# Patient Record
Sex: FEMALE | Race: WHITE | Employment: UNEMPLOYED | URBAN - METROPOLITAN AREA
[De-identification: names, ages, dates, MRNs, and addresses within clinical notes are randomized per-mention and may not be internally consistent; named-entity substitution may affect disease eponyms.]

---

## 2017-01-25 ENCOUNTER — OFFICE VISIT (OUTPATIENT)
Dept: ORTHOPEDIC SURGERY | Age: 67
End: 2017-01-25

## 2017-01-25 VITALS
HEART RATE: 78 BPM | DIASTOLIC BLOOD PRESSURE: 75 MMHG | HEIGHT: 60 IN | BODY MASS INDEX: 26.1 KG/M2 | WEIGHT: 132.94 LBS | SYSTOLIC BLOOD PRESSURE: 125 MMHG

## 2017-01-25 DIAGNOSIS — Z09 POSTOP CHECK: Primary | ICD-10-CM

## 2017-01-25 PROCEDURE — 99024 POSTOP FOLLOW-UP VISIT: CPT | Performed by: PODIATRIST

## 2017-01-25 RX ORDER — LOSARTAN POTASSIUM 25 MG/1
TABLET ORAL
Refills: 1 | COMMUNITY
Start: 2017-01-03 | End: 2022-03-28

## 2017-02-22 ENCOUNTER — OFFICE VISIT (OUTPATIENT)
Dept: ORTHOPEDIC SURGERY | Age: 67
End: 2017-02-22

## 2017-02-22 VITALS
HEART RATE: 70 BPM | DIASTOLIC BLOOD PRESSURE: 71 MMHG | HEIGHT: 60 IN | BODY MASS INDEX: 26.1 KG/M2 | SYSTOLIC BLOOD PRESSURE: 116 MMHG | WEIGHT: 132.94 LBS

## 2017-02-22 DIAGNOSIS — Z09 POSTOP CHECK: Primary | ICD-10-CM

## 2017-02-22 PROCEDURE — 4004F PT TOBACCO SCREEN RCVD TLK: CPT | Performed by: PODIATRIST

## 2017-02-22 PROCEDURE — 99999 PR OFFICE/OUTPT VISIT,PROCEDURE ONLY: CPT | Performed by: PODIATRIST

## 2018-01-16 ENCOUNTER — HOSPITAL ENCOUNTER (OUTPATIENT)
Dept: MRI IMAGING | Age: 68
Discharge: HOME | End: 2018-01-16

## 2018-01-16 ENCOUNTER — OFFICE VISIT (OUTPATIENT)
Dept: ORTHOPEDIC SURGERY | Age: 68
End: 2018-01-16

## 2018-01-16 VITALS
WEIGHT: 132.94 LBS | HEART RATE: 67 BPM | HEIGHT: 60 IN | DIASTOLIC BLOOD PRESSURE: 61 MMHG | SYSTOLIC BLOOD PRESSURE: 127 MMHG | BODY MASS INDEX: 26.1 KG/M2

## 2018-01-16 DIAGNOSIS — M75.42 IMPINGEMENT SYNDROME OF LEFT SHOULDER: ICD-10-CM

## 2018-01-16 DIAGNOSIS — M19.012 PRIMARY OSTEOARTHRITIS OF LEFT SHOULDER: ICD-10-CM

## 2018-01-16 DIAGNOSIS — M25.512 LEFT SHOULDER PAIN, UNSPECIFIED CHRONICITY: ICD-10-CM

## 2018-01-16 DIAGNOSIS — M25.512 LEFT SHOULDER PAIN, UNSPECIFIED CHRONICITY: Primary | ICD-10-CM

## 2018-01-16 PROCEDURE — G8484 FLU IMMUNIZE NO ADMIN: HCPCS | Performed by: PHYSICIAN ASSISTANT

## 2018-01-16 PROCEDURE — G8419 CALC BMI OUT NRM PARAM NOF/U: HCPCS | Performed by: PHYSICIAN ASSISTANT

## 2018-01-16 PROCEDURE — 3017F COLORECTAL CA SCREEN DOC REV: CPT | Performed by: PHYSICIAN ASSISTANT

## 2018-01-16 PROCEDURE — 1090F PRES/ABSN URINE INCON ASSESS: CPT | Performed by: PHYSICIAN ASSISTANT

## 2018-01-16 PROCEDURE — 3014F SCREEN MAMMO DOC REV: CPT | Performed by: PHYSICIAN ASSISTANT

## 2018-01-16 PROCEDURE — 4004F PT TOBACCO SCREEN RCVD TLK: CPT | Performed by: PHYSICIAN ASSISTANT

## 2018-01-16 PROCEDURE — 1123F ACP DISCUSS/DSCN MKR DOCD: CPT | Performed by: PHYSICIAN ASSISTANT

## 2018-01-16 PROCEDURE — G8427 DOCREV CUR MEDS BY ELIG CLIN: HCPCS | Performed by: PHYSICIAN ASSISTANT

## 2018-01-16 PROCEDURE — 99213 OFFICE O/P EST LOW 20 MIN: CPT | Performed by: PHYSICIAN ASSISTANT

## 2018-01-16 PROCEDURE — 4040F PNEUMOC VAC/ADMIN/RCVD: CPT | Performed by: PHYSICIAN ASSISTANT

## 2018-01-16 PROCEDURE — G8400 PT W/DXA NO RESULTS DOC: HCPCS | Performed by: PHYSICIAN ASSISTANT

## 2018-01-16 NOTE — PROGRESS NOTES
VIEWS) [id number]     Order Specific Question:   Reason for exam:     Answer:   Pain       Treatment Plan:  I have ordered the patient an MRI of her left shoulder. She does have some arthritis and signs of impingement syndrome not only on x-rays but clinical exam.  She does have some inferior subluxation of her humeral head compared to the glenohumeral joint. I would like her to see Dr. Lissette Cespedes back in office after her MRI. We have briefly discussed a cortisone injection based on MRI results and some physical therapy if warranted.

## 2018-01-22 ENCOUNTER — OFFICE VISIT (OUTPATIENT)
Dept: ORTHOPEDIC SURGERY | Age: 68
End: 2018-01-22

## 2018-01-22 DIAGNOSIS — M19.012 PRIMARY OSTEOARTHRITIS OF LEFT SHOULDER: ICD-10-CM

## 2018-01-22 DIAGNOSIS — M75.42 IMPINGEMENT SYNDROME OF LEFT SHOULDER: Primary | ICD-10-CM

## 2018-01-22 PROCEDURE — 4040F PNEUMOC VAC/ADMIN/RCVD: CPT | Performed by: ORTHOPAEDIC SURGERY

## 2018-01-22 PROCEDURE — 3014F SCREEN MAMMO DOC REV: CPT | Performed by: ORTHOPAEDIC SURGERY

## 2018-01-22 PROCEDURE — 1123F ACP DISCUSS/DSCN MKR DOCD: CPT | Performed by: ORTHOPAEDIC SURGERY

## 2018-01-22 PROCEDURE — G8400 PT W/DXA NO RESULTS DOC: HCPCS | Performed by: ORTHOPAEDIC SURGERY

## 2018-01-22 PROCEDURE — 99213 OFFICE O/P EST LOW 20 MIN: CPT | Performed by: ORTHOPAEDIC SURGERY

## 2018-01-22 PROCEDURE — 4004F PT TOBACCO SCREEN RCVD TLK: CPT | Performed by: ORTHOPAEDIC SURGERY

## 2018-01-22 PROCEDURE — 3017F COLORECTAL CA SCREEN DOC REV: CPT | Performed by: ORTHOPAEDIC SURGERY

## 2018-01-22 PROCEDURE — 20611 DRAIN/INJ JOINT/BURSA W/US: CPT | Performed by: ORTHOPAEDIC SURGERY

## 2018-01-22 PROCEDURE — 1090F PRES/ABSN URINE INCON ASSESS: CPT | Performed by: ORTHOPAEDIC SURGERY

## 2018-01-22 PROCEDURE — G8419 CALC BMI OUT NRM PARAM NOF/U: HCPCS | Performed by: ORTHOPAEDIC SURGERY

## 2018-01-22 PROCEDURE — G8484 FLU IMMUNIZE NO ADMIN: HCPCS | Performed by: ORTHOPAEDIC SURGERY

## 2018-01-22 PROCEDURE — G8427 DOCREV CUR MEDS BY ELIG CLIN: HCPCS | Performed by: ORTHOPAEDIC SURGERY

## 2018-01-22 NOTE — PROGRESS NOTES
Betamethasone 30mg/5mL 2 cc Lot # [id number]  Exp 7/2019 NDC# 0520-0086-32  Marcaine . 5% 3 cc Lot [id number] Exp 3/1/2019  NDC# 6146-8027-98    SITE:   LEFT SHOULDER
Guardado and Neer impingement exam.  Negative speed sign. Negative crossover examination. Skin: There are no rashes, ulcerations or lesions. Gait: Normal gait pattern    Reflex normal deep tendon reflexes    Additional Comments:       Additional Examinations:         Contralateral Exam: Examination of the right shoulder reveals no atrophy or deformity. Skin is warm and dry. Range of motion is within normal limits. There is no focal tenderness with palpation. No AC joint tenderness. Negative Neer and Guardado-Tripp exams. Strength is graded 5/5 throughout. Neck: Examination of the neck does not show any tenderness, deformity or injury. Range of motion is unremarkable. There is no gross instability. There are no rashes, ulcerations or lesions. Strength and tone are normal.    Radiology:       MRI results    EXAMINATION:   MRI OF THE LEFT SHOULDER WITHOUT CONTRAST   1/16/2018 1:23 pm       TECHNIQUE:   Multiplanar multisequence MRI of the left shoulder was performed without the   administration of intravenous contrast.       COMPARISON:   Left shoulder x-rays 01/16/2018       HISTORY:   ORDERING PHYSICIAN PROVIDED HISTORY: Left shoulder pain, unspecified   chronicity   TECHNOLOGIST PROVIDED HISTORY:   Technologist Provided Reason for Exam: Lt shoulder RCT   Acuity: Chronic   Type of Encounter: Subsequent/Follow-up       FINDINGS:   ROTATOR CUFF: Rotator cuff appears intact without evidence for high-grade   partial-thickness or full-thickness rotator cuff tear or tendon retraction.    Mild to moderate tendinosis to the supraspinatus tendon.  Muscle mass and   muscle signal intensities are maintained.       BICEPS TENDON: The long head biceps tendon appears intact and appropriately   located.  Mild tendinosis predominantly in the bicipital groove.  Mild   tenosynovitis within the bicipital groove.  There is also loose body or   collection of loose bodies within the biceps tendon sheath within the   bicipital

## 2018-04-16 ENCOUNTER — OFFICE VISIT (OUTPATIENT)
Dept: ORTHOPEDIC SURGERY | Age: 68
End: 2018-04-16

## 2018-04-16 VITALS — WEIGHT: 132.94 LBS | HEIGHT: 60 IN | BODY MASS INDEX: 26.1 KG/M2

## 2018-04-16 DIAGNOSIS — M75.82 TENDINITIS OF LEFT ROTATOR CUFF: Primary | ICD-10-CM

## 2018-04-16 DIAGNOSIS — M75.42 IMPINGEMENT SYNDROME OF LEFT SHOULDER: ICD-10-CM

## 2018-04-16 PROCEDURE — 4004F PT TOBACCO SCREEN RCVD TLK: CPT | Performed by: ORTHOPAEDIC SURGERY

## 2018-04-16 PROCEDURE — 3017F COLORECTAL CA SCREEN DOC REV: CPT | Performed by: ORTHOPAEDIC SURGERY

## 2018-04-16 PROCEDURE — 1123F ACP DISCUSS/DSCN MKR DOCD: CPT | Performed by: ORTHOPAEDIC SURGERY

## 2018-04-16 PROCEDURE — 4040F PNEUMOC VAC/ADMIN/RCVD: CPT | Performed by: ORTHOPAEDIC SURGERY

## 2018-04-16 PROCEDURE — L3670 SO ACRO/CLAV CAN WEB PRE OTS: HCPCS | Performed by: ORTHOPAEDIC SURGERY

## 2018-04-16 PROCEDURE — G8427 DOCREV CUR MEDS BY ELIG CLIN: HCPCS | Performed by: ORTHOPAEDIC SURGERY

## 2018-04-16 PROCEDURE — 99214 OFFICE O/P EST MOD 30 MIN: CPT | Performed by: ORTHOPAEDIC SURGERY

## 2018-04-16 PROCEDURE — 1090F PRES/ABSN URINE INCON ASSESS: CPT | Performed by: ORTHOPAEDIC SURGERY

## 2018-04-16 PROCEDURE — G8400 PT W/DXA NO RESULTS DOC: HCPCS | Performed by: ORTHOPAEDIC SURGERY

## 2018-04-16 PROCEDURE — G8419 CALC BMI OUT NRM PARAM NOF/U: HCPCS | Performed by: ORTHOPAEDIC SURGERY

## 2018-04-16 PROCEDURE — 3014F SCREEN MAMMO DOC REV: CPT | Performed by: ORTHOPAEDIC SURGERY

## 2018-04-16 RX ORDER — TRAMADOL HYDROCHLORIDE 50 MG/1
50 TABLET ORAL EVERY 6 HOURS PRN
Qty: 12 TABLET | Refills: 0 | Status: SHIPPED | OUTPATIENT
Start: 2018-04-16 | End: 2018-04-21

## 2018-04-25 ENCOUNTER — HOSPITAL ENCOUNTER (OUTPATIENT)
Dept: SURGERY | Age: 68
Discharge: HOME | End: 2018-04-25
Attending: ORTHOPAEDIC SURGERY | Admitting: ORTHOPAEDIC SURGERY

## 2018-04-25 VITALS
HEART RATE: 68 BPM | TEMPERATURE: 97 F | RESPIRATION RATE: 13 BRPM | WEIGHT: 138 LBS | BODY MASS INDEX: 27.82 KG/M2 | DIASTOLIC BLOOD PRESSURE: 61 MMHG | SYSTOLIC BLOOD PRESSURE: 105 MMHG | OXYGEN SATURATION: 100 % | HEIGHT: 59 IN

## 2018-04-25 DIAGNOSIS — Z98.890 S/P LEFT ROTATOR CUFF REPAIR: Primary | ICD-10-CM

## 2018-04-25 LAB
GLUCOSE BLD-MCNC: 98 MG/DL (ref 70–99)
PERFORMED ON: NORMAL

## 2018-04-25 RX ORDER — MIDAZOLAM HYDROCHLORIDE 1 MG/ML
INJECTION INTRAMUSCULAR; INTRAVENOUS
Status: DISPENSED
Start: 2018-04-25 | End: 2018-04-25

## 2018-04-25 RX ORDER — OXYCODONE HYDROCHLORIDE AND ACETAMINOPHEN 5; 325 MG/1; MG/1
1 TABLET ORAL EVERY 4 HOURS PRN
Qty: 40 TABLET | Refills: 0 | Status: SHIPPED | OUTPATIENT
Start: 2018-04-25 | End: 2018-05-02

## 2018-04-25 RX ORDER — SODIUM CHLORIDE 0.9 % (FLUSH) 0.9 %
10 SYRINGE (ML) INJECTION EVERY 12 HOURS SCHEDULED
Status: DISCONTINUED | OUTPATIENT
Start: 2018-04-25 | End: 2018-04-26 | Stop reason: HOSPADM

## 2018-04-25 RX ORDER — OXYCODONE HYDROCHLORIDE AND ACETAMINOPHEN 5; 325 MG/1; MG/1
1 TABLET ORAL PRN
Status: ACTIVE | OUTPATIENT
Start: 2018-04-25 | End: 2018-04-25

## 2018-04-25 RX ORDER — LIDOCAINE HYDROCHLORIDE 10 MG/ML
0.3 INJECTION, SOLUTION EPIDURAL; INFILTRATION; INTRACAUDAL; PERINEURAL
Status: ACTIVE | OUTPATIENT
Start: 2018-04-25 | End: 2018-04-25

## 2018-04-25 RX ORDER — HYDRALAZINE HYDROCHLORIDE 20 MG/ML
5 INJECTION INTRAMUSCULAR; INTRAVENOUS EVERY 10 MIN PRN
Status: DISCONTINUED | OUTPATIENT
Start: 2018-04-25 | End: 2018-04-26 | Stop reason: HOSPADM

## 2018-04-25 RX ORDER — SODIUM CHLORIDE, SODIUM LACTATE, POTASSIUM CHLORIDE, CALCIUM CHLORIDE 600; 310; 30; 20 MG/100ML; MG/100ML; MG/100ML; MG/100ML
INJECTION, SOLUTION INTRAVENOUS CONTINUOUS
Status: DISCONTINUED | OUTPATIENT
Start: 2018-04-25 | End: 2018-04-26 | Stop reason: HOSPADM

## 2018-04-25 RX ORDER — LABETALOL HYDROCHLORIDE 5 MG/ML
5 INJECTION, SOLUTION INTRAVENOUS EVERY 10 MIN PRN
Status: DISCONTINUED | OUTPATIENT
Start: 2018-04-25 | End: 2018-04-26 | Stop reason: HOSPADM

## 2018-04-25 RX ORDER — IBUPROFEN 600 MG/1
TABLET ORAL
Qty: 90 TABLET | Refills: 2 | Status: SHIPPED | OUTPATIENT
Start: 2018-04-25 | End: 2020-10-12 | Stop reason: ALTCHOICE

## 2018-04-25 RX ORDER — SODIUM CHLORIDE 0.9 % (FLUSH) 0.9 %
10 SYRINGE (ML) INJECTION PRN
Status: DISCONTINUED | OUTPATIENT
Start: 2018-04-25 | End: 2018-04-26 | Stop reason: HOSPADM

## 2018-04-25 RX ORDER — OXYCODONE HYDROCHLORIDE AND ACETAMINOPHEN 5; 325 MG/1; MG/1
2 TABLET ORAL PRN
Status: ACTIVE | OUTPATIENT
Start: 2018-04-25 | End: 2018-04-25

## 2018-04-25 RX ORDER — MEPERIDINE HYDROCHLORIDE 50 MG/ML
12.5 INJECTION INTRAMUSCULAR; INTRAVENOUS; SUBCUTANEOUS EVERY 5 MIN PRN
Status: DISCONTINUED | OUTPATIENT
Start: 2018-04-25 | End: 2018-04-26 | Stop reason: HOSPADM

## 2018-04-25 RX ORDER — ONDANSETRON 2 MG/ML
4 INJECTION INTRAMUSCULAR; INTRAVENOUS EVERY 10 MIN PRN
Status: DISCONTINUED | OUTPATIENT
Start: 2018-04-25 | End: 2018-04-26 | Stop reason: HOSPADM

## 2018-04-25 RX ADMIN — SODIUM CHLORIDE, SODIUM LACTATE, POTASSIUM CHLORIDE, CALCIUM CHLORIDE: 600; 310; 30; 20 INJECTION, SOLUTION INTRAVENOUS at 11:22

## 2018-04-25 ASSESSMENT — PAIN SCALES - GENERAL
PAINLEVEL_OUTOF10: 0

## 2018-04-27 ENCOUNTER — OFFICE VISIT (OUTPATIENT)
Dept: ORTHOPEDIC SURGERY | Age: 68
End: 2018-04-27

## 2018-04-27 DIAGNOSIS — M75.102 TEAR OF LEFT ROTATOR CUFF, UNSPECIFIED TEAR EXTENT: Primary | ICD-10-CM

## 2018-04-27 DIAGNOSIS — M75.42 IMPINGEMENT SYNDROME OF LEFT SHOULDER: ICD-10-CM

## 2018-04-27 PROCEDURE — 99024 POSTOP FOLLOW-UP VISIT: CPT | Performed by: PHYSICIAN ASSISTANT

## 2018-04-27 RX ORDER — KETOROLAC TROMETHAMINE 10 MG/1
TABLET, FILM COATED ORAL
Qty: 15 TABLET | Refills: 0 | Status: SHIPPED | OUTPATIENT
Start: 2018-04-27 | End: 2020-10-12 | Stop reason: ALTCHOICE

## 2018-05-10 ENCOUNTER — OFFICE VISIT (OUTPATIENT)
Dept: ORTHOPEDIC SURGERY | Age: 68
End: 2018-05-10

## 2018-05-10 VITALS
HEART RATE: 77 BPM | SYSTOLIC BLOOD PRESSURE: 122 MMHG | DIASTOLIC BLOOD PRESSURE: 66 MMHG | HEIGHT: 59 IN | BODY MASS INDEX: 27.82 KG/M2 | WEIGHT: 138.01 LBS

## 2018-05-10 DIAGNOSIS — M75.42 IMPINGEMENT SYNDROME OF LEFT SHOULDER: ICD-10-CM

## 2018-05-10 DIAGNOSIS — M75.122 COMPLETE TEAR OF LEFT ROTATOR CUFF: Primary | ICD-10-CM

## 2018-05-10 PROCEDURE — 99024 POSTOP FOLLOW-UP VISIT: CPT | Performed by: ORTHOPAEDIC SURGERY

## 2018-05-10 RX ORDER — OXYCODONE HYDROCHLORIDE AND ACETAMINOPHEN 5; 325 MG/1; MG/1
1 TABLET ORAL EVERY 6 HOURS PRN
Qty: 28 TABLET | Refills: 0 | Status: SHIPPED | OUTPATIENT
Start: 2018-05-10 | End: 2018-05-24 | Stop reason: SDUPTHER

## 2018-05-24 ENCOUNTER — OFFICE VISIT (OUTPATIENT)
Dept: ORTHOPEDIC SURGERY | Age: 68
End: 2018-05-24

## 2018-05-24 VITALS — HEIGHT: 60 IN | BODY MASS INDEX: 27.09 KG/M2 | WEIGHT: 138 LBS

## 2018-05-24 DIAGNOSIS — M75.122 COMPLETE TEAR OF LEFT ROTATOR CUFF: ICD-10-CM

## 2018-05-24 PROCEDURE — 99024 POSTOP FOLLOW-UP VISIT: CPT | Performed by: ORTHOPAEDIC SURGERY

## 2018-05-24 RX ORDER — OXYCODONE HYDROCHLORIDE AND ACETAMINOPHEN 5; 325 MG/1; MG/1
1 TABLET ORAL EVERY 8 HOURS PRN
Qty: 28 TABLET | Refills: 0 | Status: SHIPPED | OUTPATIENT
Start: 2018-05-24 | End: 2018-05-31

## 2018-06-06 ENCOUNTER — OFFICE VISIT (OUTPATIENT)
Dept: ORTHOPEDIC SURGERY | Age: 68
End: 2018-06-06

## 2018-06-06 VITALS
DIASTOLIC BLOOD PRESSURE: 46 MMHG | HEART RATE: 81 BPM | WEIGHT: 138.01 LBS | BODY MASS INDEX: 27.09 KG/M2 | SYSTOLIC BLOOD PRESSURE: 136 MMHG | HEIGHT: 60 IN

## 2018-06-06 DIAGNOSIS — M75.42 IMPINGEMENT SYNDROME OF LEFT SHOULDER: ICD-10-CM

## 2018-06-06 DIAGNOSIS — M75.122 COMPLETE TEAR OF LEFT ROTATOR CUFF: Primary | ICD-10-CM

## 2018-06-06 PROCEDURE — 99024 POSTOP FOLLOW-UP VISIT: CPT | Performed by: PHYSICIAN ASSISTANT

## 2018-07-12 ENCOUNTER — OFFICE VISIT (OUTPATIENT)
Dept: ORTHOPEDIC SURGERY | Age: 68
End: 2018-07-12

## 2018-07-12 VITALS
HEART RATE: 80 BPM | HEIGHT: 60 IN | WEIGHT: 138 LBS | DIASTOLIC BLOOD PRESSURE: 70 MMHG | SYSTOLIC BLOOD PRESSURE: 132 MMHG | BODY MASS INDEX: 27.09 KG/M2

## 2018-07-12 DIAGNOSIS — M75.122 COMPLETE TEAR OF LEFT ROTATOR CUFF: Primary | ICD-10-CM

## 2018-07-12 DIAGNOSIS — M75.42 IMPINGEMENT SYNDROME OF LEFT SHOULDER: ICD-10-CM

## 2018-07-12 PROCEDURE — 99024 POSTOP FOLLOW-UP VISIT: CPT | Performed by: ORTHOPAEDIC SURGERY

## 2018-09-10 ENCOUNTER — OFFICE VISIT (OUTPATIENT)
Dept: ORTHOPEDIC SURGERY | Age: 68
End: 2018-09-10

## 2018-09-10 DIAGNOSIS — M75.02 ADHESIVE CAPSULITIS OF LEFT SHOULDER: Primary | ICD-10-CM

## 2018-09-10 DIAGNOSIS — M75.82 TENDINITIS OF LEFT ROTATOR CUFF: ICD-10-CM

## 2018-09-10 PROCEDURE — 1090F PRES/ABSN URINE INCON ASSESS: CPT | Performed by: ORTHOPAEDIC SURGERY

## 2018-09-10 PROCEDURE — 4004F PT TOBACCO SCREEN RCVD TLK: CPT | Performed by: ORTHOPAEDIC SURGERY

## 2018-09-10 PROCEDURE — G8419 CALC BMI OUT NRM PARAM NOF/U: HCPCS | Performed by: ORTHOPAEDIC SURGERY

## 2018-09-10 PROCEDURE — 4040F PNEUMOC VAC/ADMIN/RCVD: CPT | Performed by: ORTHOPAEDIC SURGERY

## 2018-09-10 PROCEDURE — 99213 OFFICE O/P EST LOW 20 MIN: CPT | Performed by: ORTHOPAEDIC SURGERY

## 2018-09-10 PROCEDURE — G8400 PT W/DXA NO RESULTS DOC: HCPCS | Performed by: ORTHOPAEDIC SURGERY

## 2018-09-10 PROCEDURE — 3017F COLORECTAL CA SCREEN DOC REV: CPT | Performed by: ORTHOPAEDIC SURGERY

## 2018-09-10 PROCEDURE — 1101F PT FALLS ASSESS-DOCD LE1/YR: CPT | Performed by: ORTHOPAEDIC SURGERY

## 2018-09-10 PROCEDURE — G8427 DOCREV CUR MEDS BY ELIG CLIN: HCPCS | Performed by: ORTHOPAEDIC SURGERY

## 2018-09-10 PROCEDURE — 1123F ACP DISCUSS/DSCN MKR DOCD: CPT | Performed by: ORTHOPAEDIC SURGERY

## 2018-09-10 RX ORDER — HYDROCODONE BITARTRATE AND ACETAMINOPHEN 5; 325 MG/1; MG/1
1 TABLET ORAL EVERY 6 HOURS PRN
Qty: 28 TABLET | Refills: 0 | Status: SHIPPED | OUTPATIENT
Start: 2018-09-10 | End: 2018-09-17

## 2018-09-10 NOTE — PROGRESS NOTES
Betamethasone 30mg/5mL 2 cc Lot # [id number]  Exp 3/2020 NDC# 6124-9537-26  Bupivacaine . 25% 125mg/50mL  3 cc  Lot # [id number]  Exp  2/1/2019  NDC# 5936-7026-98    SITE:   LEFT SHLD INTRAARTICULAR

## 2018-09-10 NOTE — PROGRESS NOTES
Chief Complaint    Shoulder Pain (5 MONTHS S/P LT RTC REPAIR; physical therapist wanted her to come back in for her impingement)      History of Present Illness:  Ian Maria is a 75 y.o. female presents to the office today for follow-up visit. Patient is 5 months status post left shoulder rotator cuff repair. She was told by her physical therapist that she has impingement syndrome. She is a diabetic. She is doing fairly well but is struggling with the extremes of motions. She has extreme difficulties with internal rotation and external rotation. She has been doing physical therapy but is getting stiff. Patient denies cervical pain and upper extremity radicular symptoms. Her pain has started affecting her ability to perform ADLs.     Pain Assessment  Location of Pain: Shoulder  Location Modifiers: Left  Severity of Pain: 6  Frequency of Pain: Intermittent  Limiting Behavior: Some  Work-Related Injury: No  Are there other pain locations you wish to document?: No    Medical History:  Past Medical History:   Diagnosis Date    Diabetes mellitus ([address]     Gastritis     GERD (gastroesophageal reflux disease)     Hyperlipidemia     Neuralgia     secondary to shingles    Pes anserinus bursitis of right knee 2/15/2016    Primary osteoarthritis of right knee 2/15/2016     Patient Active Problem List    Diagnosis Date Noted    Tear of left rotator cuff 04/27/2018    Impingement syndrome of left shoulder 01/22/2018    Primary osteoarthritis of left shoulder 01/22/2018    Postop check 11/09/2016    Pes anserinus bursitis of right knee 02/15/2016    Primary osteoarthritis of right knee 02/15/2016    Tailor's bunion 06/30/2015     Past Surgical History:   Procedure Laterality Date    ANKLE FRACTURE SURGERY Right     BREAST SURGERY Right     lumpectomy    BUNIONECTOMY Right     CARPAL TUNNEL RELEASE Bilateral     CHOLECYSTECTOMY      HYSTERECTOMY      JOINT REPLACEMENT Right 2004    TKR    KNEE available in the patient's chart    Vital Signs: There were no vitals taken for this visit. General Exam:   Constitutional: Patient is adequately groomed with no evidence of malnutrition  DTRs: Deep tendon reflexes are intact  Mental Status: The patient is oriented to time, place and person. The patient's mood and affect are appropriate. Lymphatic: The lymphatic examination bilaterally reveals all areas to be without enlargement or induration. Vascular: Examination reveals no swelling or calf tenderness. Peripheral pulses are palpable and 2+. Neurological: The patient has good coordination. There is no weakness or sensory deficit. Left Shoulder Examination:    Inspection:  No rashes, scars, or lesions. No deformity or atrophy. Palpation:  No significant tenderness over the bicipital groove or acromial clavicular joint    Range of Motion:  150 degrees of forward flexion, 45° of external rotation, internal rotation L2    Strength:  5 over 5 strength with internal rotation, external rotation, elevation, and abduction. Biceps and triceps strength is 5/5. Special Tests:  Positive Guardado and Neer impingement exam.  Negative speed sign. Negative crossover examination. Skin: There are no rashes, ulcerations or lesions. Gait: Normal gait pattern    Reflex normal deep tendon reflexes    Additional Comments:       Additional Examinations:         Contralateral Exam: Examination of the right shoulder reveals no atrophy or deformity. Skin is warm and dry. Range of motion is within normal limits. There is no focal tenderness with palpation. No AC joint tenderness. Negative Neer and Guardado-Tripp exams. Strength is graded 5/5 throughout. Neck: Examination of the neck does not show any tenderness, deformity or injury. Range of motion is unremarkable. There is no gross instability. There are no rashes, ulcerations or lesions.   Strength and tone are normal.          Impression:  Encounter Diagnoses   Name Primary?  Tendinitis of left rotator cuff Yes    Adhesive capsulitis of left shoulder        Office Procedures:  Orders Placed This Encounter   Procedures    US ARTHR/ASP/INJ MAJOR JNT/BURSA LEFT    External Referral To Physical Therapy     Referral Priority:   Routine     Referral Type:   Consult for Advice and Opinion     Referral Reason:   Specialty Services Required     Requested Specialty:   Physical Therapy     Number of Visits Requested:   1    AK BETAMETHASONE ACET&SOD PHOSP     Left shoulder intra-articular cortisone injection    I discussed in detail the risk, benefits, and complications of an injection which included but are not limited to infection, skin reactions, hot swollen joints, and anaphylaxis with the patient. The patient verbalized good understanding and gave informed consent for the left shouler injection. A sterile 22-gauge spinal needle was inserted posteriorly into the intra-articular space and a mixture of 3 mL of 1% lidocaine, 3 mL of 0.5% bupivacaine, and 2ml Beta-Beta was injected under sterile technique. The needle was withdrawn and the puncture site sealed with a Band-Aid. Technique: Under sterile conditions a [employer] ultrasound unit with a variable frequency (6.0-15.0 MHz) linear transducer was used to localize the placement of a 22-gauge needle into the intra-articular space. Findings: Successful needle placement for intra-articular injection. Final images were taken and saved for permanent record. The patient tolerated the injection well. The patient was instructed to call the office immediately if there is any pain, redness, warmth, fever, or chills. Treatment Plan:  Patient is 5.5 months status post left shoulder rotator cuff repair with developing adhesive capsulitis. Have recommended an intra-articular cortisone injection into the left shoulder and continued physical therapy. Follow-up in office in approximately 4-6 weeks. We did give her  a prescription for [place].

## 2020-10-12 ENCOUNTER — HOSPITAL ENCOUNTER (EMERGENCY)
Age: 70
Discharge: HOME | End: 2020-10-12
Payer: MEDICARE

## 2020-10-12 VITALS
HEART RATE: 70 BPM | HEIGHT: 60 IN | DIASTOLIC BLOOD PRESSURE: 66 MMHG | SYSTOLIC BLOOD PRESSURE: 126 MMHG | WEIGHT: 137 LBS | RESPIRATION RATE: 16 BRPM | BODY MASS INDEX: 26.9 KG/M2 | TEMPERATURE: 98.5 F | OXYGEN SATURATION: 96 %

## 2020-10-12 LAB
A/G RATIO: 1.9 (ref 1.1–2.2)
ALBUMIN SERPL-MCNC: 4.9 G/DL (ref 3.4–5)
ALP BLD-CCNC: 51 U/L (ref 40–129)
ALT SERPL-CCNC: 27 U/L (ref 10–40)
ANION GAP SERPL CALCULATED.3IONS-SCNC: 12 MMOL/L (ref 3–16)
AST SERPL-CCNC: 23 U/L (ref 15–37)
BASOPHILS ABSOLUTE: 0.1 K/UL (ref 0–0.2)
BASOPHILS RELATIVE PERCENT: 0.7 %
BILIRUB SERPL-MCNC: <0.2 MG/DL (ref 0–1)
BILIRUBIN URINE: NEGATIVE
BLOOD, URINE: NEGATIVE
BUN BLDV-MCNC: 14 MG/DL (ref 7–20)
CALCIUM SERPL-MCNC: 10.3 MG/DL (ref 8.3–10.6)
CHLORIDE BLD-SCNC: 103 MMOL/L (ref 99–110)
CLARITY: CLEAR
CO2: 26 MMOL/L (ref 21–32)
COLOR: YELLOW
CREAT SERPL-MCNC: 0.6 MG/DL (ref 0.6–1.2)
EOSINOPHILS ABSOLUTE: 0.1 K/UL (ref 0–0.6)
EOSINOPHILS RELATIVE PERCENT: 0.9 %
GFR AFRICAN AMERICAN: >60
GFR NON-AFRICAN AMERICAN: >60
GLOBULIN: 2.6 G/DL
GLUCOSE BLD-MCNC: 100 MG/DL (ref 70–99)
GLUCOSE URINE: NEGATIVE MG/DL
HCT VFR BLD CALC: 41.5 % (ref 36–48)
HEMOGLOBIN: 13.9 G/DL (ref 12–16)
KETONES, URINE: NEGATIVE MG/DL
LEUKOCYTE ESTERASE, URINE: NEGATIVE
LYMPHOCYTES ABSOLUTE: 2.5 K/UL (ref 1–5.1)
LYMPHOCYTES RELATIVE PERCENT: 29.5 %
MCH RBC QN AUTO: 29.9 PG (ref 26–34)
MCHC RBC AUTO-ENTMCNC: 33.4 G/DL (ref 31–36)
MCV RBC AUTO: 89.5 FL (ref 80–100)
MICROSCOPIC EXAMINATION: NORMAL
MONOCYTES ABSOLUTE: 0.6 K/UL (ref 0–1.3)
MONOCYTES RELATIVE PERCENT: 7.5 %
NEUTROPHILS ABSOLUTE: 5.2 K/UL (ref 1.7–7.7)
NEUTROPHILS RELATIVE PERCENT: 61.4 %
NITRITE, URINE: NEGATIVE
PDW BLD-RTO: 14.9 % (ref 12.4–15.4)
PH UA: 7 (ref 5–8)
PLATELET # BLD: 221 K/UL (ref 135–450)
PMV BLD AUTO: 8.9 FL (ref 5–10.5)
POTASSIUM REFLEX MAGNESIUM: 4.1 MMOL/L (ref 3.5–5.1)
PROTEIN UA: NEGATIVE MG/DL
RBC # BLD: 4.63 M/UL (ref 4–5.2)
SEDIMENTATION RATE, ERYTHROCYTE: 22 MM/HR (ref 0–30)
SODIUM BLD-SCNC: 141 MMOL/L (ref 136–145)
SPECIFIC GRAVITY UA: 1.01 (ref 1–1.03)
TOTAL PROTEIN: 7.5 G/DL (ref 6.4–8.2)
URINE TYPE: NORMAL
UROBILINOGEN, URINE: 0.2 E.U./DL
WBC # BLD: 8.5 K/UL (ref 4–11)

## 2020-10-12 PROCEDURE — 85025 COMPLETE CBC W/AUTO DIFF WBC: CPT

## 2020-10-12 PROCEDURE — 80053 COMPREHEN METABOLIC PANEL: CPT

## 2020-10-12 PROCEDURE — 99283 EMERGENCY DEPT VISIT LOW MDM: CPT

## 2020-10-12 PROCEDURE — 81003 URINALYSIS AUTO W/O SCOPE: CPT

## 2020-10-12 PROCEDURE — 85652 RBC SED RATE AUTOMATED: CPT

## 2020-10-12 RX ORDER — SUCRALFATE 1 G/1
1 TABLET ORAL 2 TIMES DAILY
COMMUNITY
End: 2021-08-13

## 2020-10-12 RX ORDER — ROSUVASTATIN CALCIUM 20 MG/1
TABLET, COATED ORAL
COMMUNITY
Start: 2020-08-31 | End: 2022-06-24 | Stop reason: SDUPTHER

## 2020-10-12 ASSESSMENT — PAIN SCALES - GENERAL
PAINLEVEL_OUTOF10: 7
PAINLEVEL_OUTOF10: 7

## 2020-10-12 ASSESSMENT — ENCOUNTER SYMPTOMS
SHORTNESS OF BREATH: 0
COUGH: 0
EYES NEGATIVE: 1
COLOR CHANGE: 0
ABDOMINAL PAIN: 0
NAUSEA: 0
VOMITING: 0

## 2020-10-12 ASSESSMENT — PAIN DESCRIPTION - PAIN TYPE
TYPE: CHRONIC PAIN
TYPE: ACUTE PAIN

## 2020-10-12 ASSESSMENT — PAIN DESCRIPTION - LOCATION: LOCATION: OTHER (COMMENT)

## 2020-10-12 ASSESSMENT — PAIN DESCRIPTION - FREQUENCY: FREQUENCY: CONTINUOUS

## 2020-10-12 NOTE — ED NOTES
Pt reports generalized swelling, primary in fingers, elbows, knees and feet. Non pitting edema on assessment.      Dariana Em RN  10/12/20 7010

## 2020-10-12 NOTE — ED PROVIDER NOTES
abdominal pain, nausea and vomiting. Genitourinary: Negative. Musculoskeletal: Positive for arthralgias. Negative for neck pain. Skin: Negative for color change and rash. Neurological: Negative for dizziness and headaches. All other systems reviewed and are negative. Except as noted above in the ROS, all other systems were reviewed and negative.          PAST MEDICAL HISTORY:     Past Medical History:   Diagnosis Date    Diabetes mellitus ([facility] [address]     Gastritis     GERD (gastroesophageal reflux disease)     Hyperlipidemia     Neuralgia     secondary to shingles    Pes anserinus bursitis of right knee 2/15/2016    Primary osteoarthritis of right knee 2/15/2016         SURGICAL HISTORY:      Past Surgical History:   Procedure Laterality Date    ANKLE FRACTURE SURGERY Right     BREAST SURGERY Right     lumpectomy    BUNIONECTOMY Right     CARPAL TUNNEL RELEASE Bilateral     CHOLECYSTECTOMY      HYSTERECTOMY      JOINT REPLACEMENT Right 2004    TKR    KNEE SURGERY Right     OTHER SURGICAL HISTORY      evacuation hematoma right buttocks    SHOULDER SURGERY Right     SHOULDER SURGERY Left 04/25/2018    rotator cuff repair    TUBAL LIGATION           CURRENT MEDICATIONS:       Discharge Medication List as of 10/12/2020  2:25 PM      CONTINUE these medications which have NOT CHANGED    Details   rosuvastatin (CRESTOR) 20 MG tablet TAKE 1 TABLET BY MOUTH EVERY DAYHistorical Med      sucralfate (CARAFATE) 1 GM tablet Take 1 g by mouth 2 times dailyHistorical Med      losartan (COZAAR) 25 MG tablet TAKE 1 TABLET BY MOUTH DAILY, R-1      sertraline (ZOLOFT) 50 MG tablet Take 100 mg by mouth 2 times daily , R-1Historical Med      Probiotic Product (PROBIOTIC PO) Take by mouth daily      aspirin 81 MG tablet Take 81 mg by mouth daily      ACCU-CHEK SMARTVIEW strip , R-3      metFORMIN ER (GLUCOPHAGE-XR) 500 MG XR tablet daily (with breakfast) , R-0Historical Med      omeprazole (PRILOSEC) 20 MG capsule Take 20 mg by mouth Daily , R-0               ALLERGIES:    Latex;  Lisinopril; Codeine; and Nickel    FAMILY HISTORY:       Family History   Problem Relation Age of Onset    Severe Sprains Brother     Heart Disease Brother     Cancer Paternal Aunt     Osteoporosis Paternal Uncle     Cancer Mother         esophageal    Heart Disease Father     Diabetes Father     Heart Disease Son           SOCIAL HISTORY:       Social History     Socioeconomic History    Marital status:      Spouse name: None    Number of children: None    Years of education: None    Highest education level: None   Occupational History    None   Social Needs    Financial resource strain: None    Food insecurity     Worry: None     Inability: None    Transportation needs     Medical: None     Non-medical: None   Tobacco Use    Smoking status: Current Every Day Smoker     Packs/day: 0.50     Years: 30.00     Pack years: 15.00    Smokeless tobacco: Never Used    Tobacco comment: pt quitting 1.5 pack a month    Substance and Sexual Activity    Alcohol use: No     Alcohol/week: 0.0 standard drinks    Drug use: No    Sexual activity: Yes     Partners: Male   Lifestyle    Physical activity     Days per week: None     Minutes per session: None    Stress: None   Relationships    Social connections     Talks on phone: None     Gets together: None     Attends Yazidism service: None     Active member of club or organization: None     Attends meetings of clubs or organizations: None     Relationship status: None    Intimate partner violence     Fear of current or ex partner: None     Emotionally abused: None     Physically abused: None     Forced sexual activity: None   Other Topics Concern    None   Social History Narrative    None       SCREENINGS:             PHYSICAL EXAM:       ED Triage Vitals [10/12/20 1116]   BP Temp Temp Source Pulse Resp SpO2 Height Weight   (!) 151/68 98.5 °F (36.9 °C) Oral 86 16 96 % 5' (1.524 m) 137 lb (62.1 kg)       Physical Exam    CONSTITUTIONAL: Awake and alert. Cooperative. Well-developed. Well-nourished. Non-toxic. No acute distress. HENT: Normocephalic. Atraumatic. External ears normal, without discharge. No nasal discharge. Oropharynx clear. Mucous membranes moist.  EYES: Conjunctiva non-injected. No scleral icterus. PERRL. EOM's grossly intact. NECK: Supple. Normal ROM. CARDIOVASCULAR: RRR. No Murmer. Intact distal pulses. PULMONARY/CHEST WALL: Effort normal. No tachypnea. Lungs clear to ausculation. ABDOMEN: Normal BS. Soft. Nondistended. No tenderness to palpate. No guarding. /ANORECTAL: Not assessed  MUSKULOSKELETAL: Normal ROM. No acute deformities. No edema. No objective swelling. No tenderness to palpate. SKIN: Warm and dry. No rash. No erythema. NEUROLOGICAL: Alert and oriented x 3. GCS 15. CN II-XII grossly intact. Strength is 5/5 in all extremities and sensation is intact. Normal gait.   PSYCHIATRIC: Normal affect        DIAGNOSTICRESULTS:     LABS:    Results for orders placed or performed during the hospital encounter of 10/12/20   CBC Auto Differential   Result Value Ref Range    WBC 8.5 4.0 - 11.0 K/uL    RBC 4.63 4.00 - 5.20 M/uL    Hemoglobin 13.9 12.0 - 16.0 g/dL    Hematocrit 41.5 36.0 - 48.0 %    MCV 89.5 80.0 - 100.0 fL    MCH 29.9 26.0 - 34.0 pg    MCHC 33.4 31.0 - 36.0 g/dL    RDW 14.9 12.4 - 15.4 %    Platelets 524 928 - 434 K/uL    MPV 8.9 5.0 - 10.5 fL    Neutrophils % 61.4 %    Lymphocytes % 29.5 %    Monocytes % 7.5 %    Eosinophils % 0.9 %    Basophils % 0.7 %    Neutrophils Absolute 5.2 1.7 - 7.7 K/uL    Lymphocytes Absolute 2.5 1.0 - 5.1 K/uL    Monocytes Absolute 0.6 0.0 - 1.3 K/uL    Eosinophils Absolute 0.1 0.0 - 0.6 K/uL    Basophils Absolute 0.1 0.0 - 0.2 K/uL   Comprehensive Metabolic Panel w/ Reflex to MG   Result Value Ref Range    Sodium 141 136 - 145 mmol/L    Potassium reflex Magnesium 4.1 3.5 - 5.1 mmol/L    Chloride 103 99 - 110 mmol/L    CO2 26 21 - 32 mmol/L    Anion Gap 12 3 - 16    Glucose 100 (H) 70 - 99 mg/dL    BUN 14 7 - 20 mg/dL    CREATININE 0.6 0.6 - 1.2 mg/dL    GFR Non-African American >60 >60    GFR African American >60 >60    Calcium 10.3 8.3 - 10.6 mg/dL    Total Protein 7.5 6.4 - 8.2 g/dL    Alb 4.9 3.4 - 5.0 g/dL    Albumin/Globulin Ratio 1.9 1.1 - 2.2    Total Bilirubin <0.2 0.0 - 1.0 mg/dL    Alkaline Phosphatase 51 40 - 129 U/L    ALT 27 10 - 40 U/L    AST 23 15 - 37 U/L    Globulin 2.6 g/dL   Sedimentation Rate   Result Value Ref Range    Sed Rate 22 0 - 30 mm/Hr   Urinalysis, reflex to microscopic   Result Value Ref Range    Color, UA Yellow Straw/Yellow    Clarity, UA Clear Clear    Glucose, Ur Negative Negative mg/dL    Bilirubin Urine Negative Negative    Ketones, Urine Negative Negative mg/dL    Specific Gravity, UA 1.010 1.005 - 1.030    Blood, Urine Negative Negative    pH, UA 7.0 5.0 - 8.0    Protein, UA Negative Negative mg/dL    Urobilinogen, Urine 0.2 <2.0 E.U./dL    Nitrite, Urine Negative Negative    Leukocyte Esterase, Urine Negative Negative    Microscopic Examination Not Indicated     Urine Type NotGiven            PROCEDURES:   N/A    CRITICAL CARE TIME:       None      CONSULTS:  None      EMERGENCY DEPARTMENT COURSE and DIFFERENTIAL DIAGNOSIS/MDM:   Vitals:    Vitals:    10/12/20 1116 10/12/20 1306   BP: (!) 151/68 126/66   Pulse: 86 70   Resp: 16 16   Temp: 98.5 °F (36.9 °C)    TempSrc: Oral    SpO2: 96% 96%   Weight: 137 lb (62.1 kg)    Height: 5' (1.524 m)        Patient was given the following medications:  None    I have evaluated this patient in the ED. Old records were reviewed. Patient is here describing over 1 week of swelling to the legs which she believes is lymph fluid building up in different parts of her legs. She reports generalized joint pain as well. She is not febrile and has normal vital signs.   She reports seeing primary care on Friday and having a specific blood test done to check for a possible worm infection. The patient does not have objective findings of swelling in her legs. There is no joint swelling. There are no skin changes. A screening CBC, metabolic panel, sed rate and urinalysis are done and are all normal.  I told the patient to continue planned follow-up with primary care. I do not know that there is any more emergent testing to be done from the ED. Patient does acknowledge understanding of this and will be ultimately discharged from ED in stable condition. I estimate there is LOW risk for CHF, ACUTE FRACTURE, COMPARTMENT SYNDROME, DEEP VENOUS THROMBOSIS, SEPTIC ARTHRITIS, TENDON OR NEUROVASCULAR INJURY, thus I consider the discharge disposition reasonable. Jim Tadeo and I have discussed the diagnosis and risks, and we agree with discharging home to follow-up with their primary doctor or the referral orthopedist. We also discussed returning to the Emergency Department immediately if new or worsening symptoms occur. We have discussed the symptoms which are most concerning (e.g., changing or worsening pain, numbness, weakness) that necessitate immediate return. FINAL IMPRESSION:      1. Leg swelling    2.  Arthralgia, unspecified joint          DISPOSITION/PLAN:   DISPOSITION     DISCHARGE    PATIENT REFERRED TO:  Aldair Nair Higinio [place]  [phone]    Schedule an appointment as soon as possible for a visit         DISCHARGE MEDICATIONS:  Discharge Medication List as of 10/12/2020  2:25 PM                     (Please note thatportions of this note were completed with a voice recognition program.  Efforts were made to edit the dictations, but occasionally words are mis-transcribed.)    Nelia Hollis PA-C (electronicallysigned)              Dago Coreas, [id number]  10/12/20 9157

## 2021-03-01 LAB
ALBUMIN SERPL-MCNC: 4 G/DL
ALP BLD-CCNC: 55 U/L
ALT SERPL-CCNC: 66 U/L
ANION GAP SERPL CALCULATED.3IONS-SCNC: 9 MMOL/L
AST SERPL-CCNC: 39 U/L
BILIRUB SERPL-MCNC: 0.32 MG/DL (ref 0.1–1.4)
BUN BLDV-MCNC: 12 MG/DL
CALCIUM SERPL-MCNC: 9.4 MG/DL
CHLORIDE BLD-SCNC: 104 MMOL/L
CHOLESTEROL, TOTAL: 181 MG/DL
CHOLESTEROL/HDL RATIO: ABNORMAL
CO2: 26.6 MMOL/L
CREAT SERPL-MCNC: 0.73 MG/DL
GFR CALCULATED: 79
GLUCOSE BLD-MCNC: 116 MG/DL
HDLC SERPL-MCNC: 99 MG/DL (ref 35–70)
LDL CHOLESTEROL CALCULATED: 66 MG/DL (ref 0–160)
NONHDLC SERPL-MCNC: ABNORMAL MG/DL
POTASSIUM SERPL-SCNC: 3.9 MMOL/L
SODIUM BLD-SCNC: 140 MMOL/L
TOTAL PROTEIN: 7.1
TRIGL SERPL-MCNC: 82 MG/DL
VLDLC SERPL CALC-MCNC: 16 MG/DL

## 2021-08-13 ENCOUNTER — APPOINTMENT (OUTPATIENT)
Dept: CT IMAGING | Age: 71
End: 2021-08-13
Payer: MEDICARE

## 2021-08-13 ENCOUNTER — HOSPITAL ENCOUNTER (EMERGENCY)
Age: 71
Discharge: HOME HEALTH | End: 2021-08-13
Attending: EMERGENCY MEDICINE
Payer: MEDICARE

## 2021-08-13 ENCOUNTER — APPOINTMENT (OUTPATIENT)
Dept: GENERAL RADIOLOGY | Age: 71
End: 2021-08-13
Payer: MEDICARE

## 2021-08-13 VITALS
OXYGEN SATURATION: 98 % | TEMPERATURE: 99.2 F | HEIGHT: 60 IN | WEIGHT: 136 LBS | BODY MASS INDEX: 26.7 KG/M2 | RESPIRATION RATE: 18 BRPM | DIASTOLIC BLOOD PRESSURE: 66 MMHG | HEART RATE: 65 BPM | SYSTOLIC BLOOD PRESSURE: 118 MMHG

## 2021-08-13 DIAGNOSIS — E87.6 HYPOKALEMIA: ICD-10-CM

## 2021-08-13 DIAGNOSIS — R11.2 NAUSEA VOMITING AND DIARRHEA: Primary | ICD-10-CM

## 2021-08-13 DIAGNOSIS — R74.8 ELEVATED LIVER ENZYMES: ICD-10-CM

## 2021-08-13 DIAGNOSIS — N12 PYELONEPHRITIS: ICD-10-CM

## 2021-08-13 DIAGNOSIS — N28.9 RENAL LESION: ICD-10-CM

## 2021-08-13 DIAGNOSIS — R19.7 NAUSEA VOMITING AND DIARRHEA: Primary | ICD-10-CM

## 2021-08-13 LAB
A/G RATIO: 1.3 (ref 1.1–2.2)
ALBUMIN SERPL-MCNC: 4.3 G/DL (ref 3.4–5)
ALP BLD-CCNC: 64 U/L (ref 40–129)
ALT SERPL-CCNC: 237 U/L (ref 10–40)
ANION GAP SERPL CALCULATED.3IONS-SCNC: 15 MMOL/L (ref 3–16)
AST SERPL-CCNC: 218 U/L (ref 15–37)
BACTERIA: ABNORMAL /HPF
BASOPHILS ABSOLUTE: 0 K/UL (ref 0–0.2)
BASOPHILS RELATIVE PERCENT: 0.5 %
BILIRUB SERPL-MCNC: 0.3 MG/DL (ref 0–1)
BILIRUBIN URINE: ABNORMAL
BLOOD, URINE: ABNORMAL
BUN BLDV-MCNC: 16 MG/DL (ref 7–20)
CALCIUM SERPL-MCNC: 9.6 MG/DL (ref 8.3–10.6)
CHLORIDE BLD-SCNC: 99 MMOL/L (ref 99–110)
CLARITY: ABNORMAL
CO2: 24 MMOL/L (ref 21–32)
COLOR: YELLOW
CREAT SERPL-MCNC: 0.7 MG/DL (ref 0.6–1.2)
EOSINOPHILS ABSOLUTE: 0 K/UL (ref 0–0.6)
EOSINOPHILS RELATIVE PERCENT: 0.4 %
EPITHELIAL CELLS, UA: ABNORMAL /HPF (ref 0–5)
GFR AFRICAN AMERICAN: >60
GFR NON-AFRICAN AMERICAN: >60
GLOBULIN: 3.2 G/DL
GLUCOSE BLD-MCNC: 112 MG/DL (ref 70–99)
GLUCOSE URINE: NEGATIVE MG/DL
HCT VFR BLD CALC: 34.4 % (ref 36–48)
HEMOGLOBIN: 10.9 G/DL (ref 12–16)
INR BLD: 1.09 (ref 0.88–1.12)
KETONES, URINE: ABNORMAL MG/DL
LACTIC ACID: 1.1 MMOL/L (ref 0.4–2)
LEUKOCYTE ESTERASE, URINE: NEGATIVE
LIPASE: 23 U/L (ref 13–60)
LYMPHOCYTES ABSOLUTE: 1.4 K/UL (ref 1–5.1)
LYMPHOCYTES RELATIVE PERCENT: 15.3 %
MAGNESIUM: 1.8 MG/DL (ref 1.8–2.4)
MCH RBC QN AUTO: 25.4 PG (ref 26–34)
MCHC RBC AUTO-ENTMCNC: 31.8 G/DL (ref 31–36)
MCV RBC AUTO: 79.9 FL (ref 80–100)
MICROSCOPIC EXAMINATION: YES
MONOCYTES ABSOLUTE: 1.1 K/UL (ref 0–1.3)
MONOCYTES RELATIVE PERCENT: 12.4 %
NEUTROPHILS ABSOLUTE: 6.6 K/UL (ref 1.7–7.7)
NEUTROPHILS RELATIVE PERCENT: 71.4 %
NITRITE, URINE: NEGATIVE
PDW BLD-RTO: 19.1 % (ref 12.4–15.4)
PH UA: 6 (ref 5–8)
PLATELET # BLD: 212 K/UL (ref 135–450)
PMV BLD AUTO: 8.4 FL (ref 5–10.5)
POTASSIUM REFLEX MAGNESIUM: 3.2 MMOL/L (ref 3.5–5.1)
PROTEIN UA: 100 MG/DL
PROTHROMBIN TIME: 12.4 SEC (ref 9.9–12.7)
RBC # BLD: 4.31 M/UL (ref 4–5.2)
RBC UA: ABNORMAL /HPF (ref 0–4)
SARS-COV-2, NAAT: NOT DETECTED
SODIUM BLD-SCNC: 138 MMOL/L (ref 136–145)
SPECIFIC GRAVITY UA: 1.02 (ref 1–1.03)
TOTAL PROTEIN: 7.5 G/DL (ref 6.4–8.2)
URINE REFLEX TO CULTURE: YES
URINE TYPE: ABNORMAL
UROBILINOGEN, URINE: 0.2 E.U./DL
WBC # BLD: 9.3 K/UL (ref 4–11)
WBC UA: ABNORMAL /HPF (ref 0–5)

## 2021-08-13 PROCEDURE — 96374 THER/PROPH/DIAG INJ IV PUSH: CPT

## 2021-08-13 PROCEDURE — 87086 URINE CULTURE/COLONY COUNT: CPT

## 2021-08-13 PROCEDURE — 80074 ACUTE HEPATITIS PANEL: CPT

## 2021-08-13 PROCEDURE — 81001 URINALYSIS AUTO W/SCOPE: CPT

## 2021-08-13 PROCEDURE — 80053 COMPREHEN METABOLIC PANEL: CPT

## 2021-08-13 PROCEDURE — 85025 COMPLETE CBC W/AUTO DIFF WBC: CPT

## 2021-08-13 PROCEDURE — 83735 ASSAY OF MAGNESIUM: CPT

## 2021-08-13 PROCEDURE — 87186 SC STD MICRODIL/AGAR DIL: CPT

## 2021-08-13 PROCEDURE — 83605 ASSAY OF LACTIC ACID: CPT

## 2021-08-13 PROCEDURE — 6360000004 HC RX CONTRAST MEDICATION: Performed by: NURSE PRACTITIONER

## 2021-08-13 PROCEDURE — 71046 X-RAY EXAM CHEST 2 VIEWS: CPT

## 2021-08-13 PROCEDURE — 96361 HYDRATE IV INFUSION ADD-ON: CPT

## 2021-08-13 PROCEDURE — 85610 PROTHROMBIN TIME: CPT

## 2021-08-13 PROCEDURE — 2580000003 HC RX 258: Performed by: NURSE PRACTITIONER

## 2021-08-13 PROCEDURE — 6360000002 HC RX W HCPCS: Performed by: NURSE PRACTITIONER

## 2021-08-13 PROCEDURE — 6370000000 HC RX 637 (ALT 250 FOR IP): Performed by: NURSE PRACTITIONER

## 2021-08-13 PROCEDURE — 6360000004 HC RX CONTRAST MEDICATION

## 2021-08-13 PROCEDURE — 83690 ASSAY OF LIPASE: CPT

## 2021-08-13 PROCEDURE — 74177 CT ABD & PELVIS W/CONTRAST: CPT

## 2021-08-13 PROCEDURE — 99284 EMERGENCY DEPT VISIT MOD MDM: CPT

## 2021-08-13 PROCEDURE — 36415 COLL VENOUS BLD VENIPUNCTURE: CPT

## 2021-08-13 PROCEDURE — 87635 SARS-COV-2 COVID-19 AMP PRB: CPT

## 2021-08-13 PROCEDURE — 87088 URINE BACTERIA CULTURE: CPT

## 2021-08-13 RX ORDER — DICYCLOMINE HYDROCHLORIDE 10 MG/1
10 CAPSULE ORAL
Qty: 120 CAPSULE | Refills: 3 | Status: SHIPPED | OUTPATIENT
Start: 2021-08-13 | End: 2022-03-23

## 2021-08-13 RX ORDER — ONDANSETRON 2 MG/ML
4 INJECTION INTRAMUSCULAR; INTRAVENOUS ONCE
Status: COMPLETED | OUTPATIENT
Start: 2021-08-13 | End: 2021-08-13

## 2021-08-13 RX ORDER — CEFUROXIME AXETIL 250 MG/1
250 TABLET ORAL 2 TIMES DAILY
Qty: 14 TABLET | Refills: 0 | Status: SHIPPED | OUTPATIENT
Start: 2021-08-13 | End: 2021-08-20

## 2021-08-13 RX ORDER — POTASSIUM CHLORIDE 20 MEQ/1
40 TABLET, EXTENDED RELEASE ORAL ONCE
Status: COMPLETED | OUTPATIENT
Start: 2021-08-13 | End: 2021-08-13

## 2021-08-13 RX ORDER — ACETAMINOPHEN 500 MG
500 TABLET ORAL ONCE
Status: COMPLETED | OUTPATIENT
Start: 2021-08-13 | End: 2021-08-13

## 2021-08-13 RX ORDER — ONDANSETRON 4 MG/1
4 TABLET, FILM COATED ORAL EVERY 8 HOURS PRN
Qty: 20 TABLET | Refills: 0 | Status: SHIPPED | OUTPATIENT
Start: 2021-08-13 | End: 2022-03-23

## 2021-08-13 RX ORDER — SUCRALFATE 1 G/1
1 TABLET ORAL 4 TIMES DAILY
Qty: 120 TABLET | Refills: 3 | Status: SHIPPED | OUTPATIENT
Start: 2021-08-13 | End: 2022-06-24

## 2021-08-13 RX ORDER — 0.9 % SODIUM CHLORIDE 0.9 %
500 INTRAVENOUS SOLUTION INTRAVENOUS ONCE
Status: COMPLETED | OUTPATIENT
Start: 2021-08-13 | End: 2021-08-13

## 2021-08-13 RX ADMIN — ACETAMINOPHEN 500 MG: 500 TABLET ORAL at 15:28

## 2021-08-13 RX ADMIN — IOPAMIDOL 75 ML: 755 INJECTION, SOLUTION INTRAVENOUS at 17:02

## 2021-08-13 RX ADMIN — IOHEXOL 50 ML: 240 INJECTION, SOLUTION INTRATHECAL; INTRAVASCULAR; INTRAVENOUS; ORAL at 15:28

## 2021-08-13 RX ADMIN — ONDANSETRON HYDROCHLORIDE 4 MG: 2 INJECTION, SOLUTION INTRAMUSCULAR; INTRAVENOUS at 16:49

## 2021-08-13 RX ADMIN — POTASSIUM CHLORIDE 40 MEQ: 1500 TABLET, EXTENDED RELEASE ORAL at 16:40

## 2021-08-13 RX ADMIN — SODIUM CHLORIDE 500 ML: 9 INJECTION, SOLUTION INTRAVENOUS at 15:50

## 2021-08-13 ASSESSMENT — ENCOUNTER SYMPTOMS
NAUSEA: 1
DIARRHEA: 1
VOMITING: 1
SORE THROAT: 0
ABDOMINAL PAIN: 0
COLOR CHANGE: 0
SHORTNESS OF BREATH: 0
RHINORRHEA: 0

## 2021-08-13 ASSESSMENT — PAIN DESCRIPTION - PAIN TYPE: TYPE: ACUTE PAIN

## 2021-08-13 ASSESSMENT — PAIN SCALES - GENERAL
PAINLEVEL_OUTOF10: 3

## 2021-08-13 NOTE — ED NOTES
1256- Straight stick completed for blood.  lab led and sent to lab       [facility] [place]  08/13/21 [id number]

## 2021-08-13 NOTE — ED PROVIDER NOTES
Neurological: Negative for dizziness and light-headedness. Psychiatric/Behavioral: Negative for agitation. All other systems reviewed and are negative. Positives and Pertinent negatives as per HPI. Except as noted above in the ROS, all other systems were reviewed and negative.        PAST MEDICAL HISTORY     Past Medical History:   Diagnosis Date    Diabetes mellitus ([address]     Gastritis     GERD (gastroesophageal reflux disease)     Hyperlipidemia     Neuralgia     secondary to shingles    Pes anserinus bursitis of right knee 2/15/2016    Primary osteoarthritis of right knee 2/15/2016         SURGICAL HISTORY       Past Surgical History:   Procedure Laterality Date    ANKLE FRACTURE SURGERY Right     BREAST SURGERY Right     lumpectomy    BUNIONECTOMY Right     CARPAL TUNNEL RELEASE Bilateral     CHOLECYSTECTOMY      HYSTERECTOMY      JOINT REPLACEMENT Right 2004    TKR    KNEE SURGERY Right     OTHER SURGICAL HISTORY      evacuation hematoma right buttocks    SHOULDER SURGERY Right     SHOULDER SURGERY Left 04/25/2018    rotator cuff repair    TUBAL LIGATION           CURRENT MEDICATIONS       Discharge Medication List as of 8/13/2021  6:43 PM      CONTINUE these medications which have NOT CHANGED    Details   rosuvastatin (CRESTOR) 20 MG tablet TAKE 1 TABLET BY MOUTH EVERY DAYHistorical Med      losartan (COZAAR) 25 MG tablet TAKE 1 TABLET BY MOUTH DAILY, R-1      sertraline (ZOLOFT) 50 MG tablet Take 100 mg by mouth 2 times daily , R-1Historical Med      Probiotic Product (PROBIOTIC PO) Take by mouth daily      aspirin 81 MG tablet Take 81 mg by mouth daily      ACCU-CHEK SMARTVIEW strip , R-3      metFORMIN ER (GLUCOPHAGE-XR) 500 MG XR tablet daily (with breakfast) , R-0Historical Med      omeprazole (PRILOSEC) 20 MG capsule Take 20 mg by mouth Daily , R-0               ALLERGIES     Latex, Lisinopril, Codeine, and Nickel    FAMILY HISTORY       Family History   Problem Relation Age of Onset    Severe Sprains Brother     Heart Disease Brother     Cancer Paternal Aunt     Osteoporosis Paternal Uncle     Cancer Mother         esophageal    Heart Disease Father     Diabetes Father     Heart Disease Son           SOCIAL HISTORY       Social History     Socioeconomic History    Marital status:      Spouse name: Not on file    Number of children: Not on file    Years of education: Not on file    Highest education level: Not on file   Occupational History    Not on file   Tobacco Use    Smoking status: Current Every Day Smoker     Packs/day: 0.50     Years: 30.00     Pack years: 15.00    Smokeless tobacco: Never Used    Tobacco comment: pt quitting 1.5 pack a month    Vaping Use    Vaping Use: Never assessed   Substance and Sexual Activity    Alcohol use: No     Alcohol/week: 0.0 standard drinks    Drug use: No    Sexual activity: Yes     Partners: Male   Other Topics Concern    Not on file   Social History Narrative    Not on file     Social Determinants of Health     Financial Resource Strain:     Difficulty of Paying Living Expenses:    Food Insecurity:     Worried About Running Out of Food in the Last Year:     Ran Out of Food in the Last Year:    Transportation Needs:     Lack of Transportation (Medical):      Lack of Transportation (Non-Medical):    Physical Activity:     Days of Exercise per Week:     Minutes of Exercise per Session:    Stress:     Feeling of Stress :    Social Connections:     Frequency of Communication with Friends and Family:     Frequency of Social Gatherings with Friends and Family:     Attends Restoration[employer] Services:     Active Member of Clubs or Organizations:     Attends Club or Organization Meetings:     Marital Status:    Intimate Partner Violence:     Fear of Current or Ex-Partner:     Emotionally Abused:     Physically Abused:     Sexually Abused:        SCREENINGS             PHYSICAL EXAM  (up to 7 for level 4, 8 or more for level 5)     ED Triage Vitals [08/13/21 1144]   BP Temp Temp Source Pulse Resp SpO2 Height Weight   (!) 117/57 99.4 °F (37.4 °C) Oral 73 16 98 % 5' (1.524 m) 136 lb (61.7 kg)       Physical Exam  Constitutional:       Appearance: She is well-developed. HENT:      Head: Normocephalic and atraumatic. Cardiovascular:      Rate and Rhythm: Normal rate. Pulmonary:      Effort: Pulmonary effort is normal. No respiratory distress. Abdominal:      General: There is no distension. Palpations: Abdomen is soft. Tenderness: There is no abdominal tenderness. Musculoskeletal:         General: Normal range of motion. Cervical back: Normal range of motion. Skin:     General: Skin is warm and dry. Neurological:      Mental Status: She is alert and oriented to person, place, and time.          DIAGNOSTIC RESULTS   LABS:    Labs Reviewed   CBC WITH AUTO DIFFERENTIAL - Abnormal; Notable for the following components:       Result Value    Hemoglobin 10.9 (*)     Hematocrit 34.4 (*)     MCV 79.9 (*)     MCH 25.4 (*)     RDW 19.1 (*)     All other components within normal limits    Narrative:     Performed at:  [facility],  [employer], [facility]   Phone [phone]   COMPREHENSIVE METABOLIC PANEL W/ REFLEX TO MG FOR LOW K - Abnormal; Notable for the following components:    Potassium reflex Magnesium 3.2 (*)     Glucose 112 (*)      (*)      (*)     All other components within normal limits    Narrative:     Performed at:  [facility],  [employer], [facility]   Phone [phone]   URINE RT REFLEX TO CULTURE - Abnormal; Notable for the following components:    Clarity, UA CLOUDY (*)     Bilirubin Urine SMALL (*)     Ketones, Urine TRACE (*)     Blood, Urine MODERATE (*)     Protein,  (*)     All other components within normal limits    Narrative:     Performed at:  [facility] - [facility],  [place], [facility]   Phone [phone]   MICROSCOPIC URINALYSIS - Abnormal; Notable for the following components:    WBC, UA 10-20 (*)     RBC, UA 5-10 (*)     Bacteria, UA 4+ (*)     All other components within normal limits    Narrative:     Performed at:  [facility],  [place], [facility]   Phone [phone], RAPID    Narrative:     Performed at:  [place] [address],  [place], [facility]   Phone [phone]   CULTURE, URINE   MAGNESIUM    Narrative:     Performed at:  [place] [address],  [place], [facility]   Phone [phone]   LIPASE    Narrative:     Performed at:  [place] [address],  [place], [facility]   Phone [phone]   LACTIC ACID, PLASMA    Narrative:     Performed at:  [place] [address],  [place], [facility]   Phone [phone]   PROTIME-INR    Narrative:     Performed at:  [address] [place] [address],  [place], [facility]   Phone [phone]   HEPATITIS PANEL, ACUTE       All other labs werewithin normal range or not returned as of this dictation. EKG: All EKG's are interpreted by the Emergency Department Physician who either signs or Co-signs this chart in the absence of a cardiologist.  Please see their note for interpretation of EKG. RADIOLOGY:   Chest x-ray interpreted by radiologist for  FINDINGS:   The cardiac silhouette, mediastinal hilar contours are normal.  Thoracic   aortic calcification is present.  No consolidation or pleural effusion is   identified.  No acute fractures are seen.  Multilevel spondylosis of the   thoracic spine.      Abdominal CT with IV contrast interpreted by radiologist for  Impression:    Inhomogeneous left nephrogram, suggestive of pyelonephritis.  1.6 cm complex   cyst versus abscess in the superior pole of the left kidney.  Follow-up   imaging is recommended (for instance CT renal mass protocol in 3 months). The attenuation of the liver is normal. Jamila Kallies is post cholecystectomy   prominence of the common duct, most likely due to reservoir effect. Depending on degree of clinical suspicion, follow-up MRCP/ERCP may be   considered. Interpretation per the Radiologist below, if available at the time of this note:    XR CHEST (2 VW)   Final Result   No acute cardiopulmonary disease. CT ABDOMEN PELVIS W IV CONTRAST Additional Contrast? Oral   Final Result   Inhomogeneous left nephrogram, suggestive of pyelonephritis. 1.6 cm complex   cyst versus abscess in the superior pole of the left kidney. Follow-up   imaging is recommended (for instance CT renal mass protocol in 3 months). The attenuation of the liver is normal.  There is post cholecystectomy   prominence of the common duct, most likely due to reservoir effect. Depending on degree of clinical suspicion, follow-up MRCP/ERCP may be   considered. No results found.       PROCEDURES   Unless otherwise noted below, none     Procedures     CRITICAL CARE TIME   N/A    CONSULTS:  None      EMERGENCYDEPARTMENT COURSE and DIFFERENTIAL DIAGNOSIS/MDM:   Vitals:    Vitals:    08/13/21 1144 08/13/21 1515 08/13/21 1748 08/13/21 1845   BP: (!) 117/57 (!) 110/57 118/66 118/66   Pulse: 73 63 66 65   Resp: 16 16 16 18   Temp: 99.4 °F (37.4 °C) 99.2 °F (37.3 °C)     TempSrc: Oral Oral     SpO2: 98% 98%  98%   Weight: 136 lb (61.7 kg)      Height: 5' (1.524 m)          Patient was given the following medications:  Medications   iopamidol (ISOVUE-370) 76 % injection 75 mL (75 mLs Intravenous Given 8/13/21 1702)   0.9 % sodium chloride bolus (0 mLs Intravenous Stopped 8/13/21 1650)   acetaminophen (TYLENOL) tablet 500 mg (500 mg Oral Given 8/13/21 1528) iohexol (OMNIPAQUE 240) 240 MG/ML injection (50 mLs  Given 8/13/21 1528)   potassium chloride (KLOR-CON M) extended release tablet 40 mEq (40 mEq Oral Given 8/13/21 1640)   ondansetron (ZOFRAN) injection 4 mg (4 mg Intravenous Given 8/13/21 1649)       Patient was seen and evaluated by myself and Dr. Helayne Riedel. Patient here for concerns for the fever. Patient states that she has not felt well for the last 6 days. She reports she was starting to feel little bit better however 3 days ago started developing nausea and vomiting. Patient states that she has had some diarrhea but denies any C. difficile risk factors. On exam the patient is awake and alert hemodynamically stable nontoxic in appearance. Lab values have been reviewed and interpreted. Patient was found to have elevated liver enzymes. She denies any alcohol use history of hepatitis or pancreatitis. Abdominal CT was obtained and was also concerning for pyelonephritis or lesion to her left kidney. Patient will be treated for UTI with Ceftin. She was given a dose in the ED and a prescription for home use. Patient was requesting a new primary care doctor and was given a referral.  She was encouraged to follow-up and establish care. Patient was also given a GI referral.  Patient was discharged home with Bentyl Zofran and Carafate prescriptions in addition to her antibiotics. She was encouraged to return to the ED for worsening symptoms. Patient was ultimately discharged home with all questions answered. The patient tolerated their visit well. I have evaluated this patient. My supervising physician was available for consultation. The patient and / or the family were informed of the results of any tests, a time was given to answer questions, a plan was proposed and they agreed with plan. FINAL IMPRESSION      1. Nausea vomiting and diarrhea    2. Renal lesion    3. Pyelonephritis    4. Elevated liver enzymes    5.  Hypokalemia DISPOSITION/PLAN   DISPOSITION Decision To Discharge 08/13/2021 06:58:37 PM      PATIENT REFERRED TO:  Tania Massey  [phone]  Call in 1 day  for re-evaluation    Solomon Douglas, [address] [place] [address]  [address] [place], [place] [address] [phone]    Call   for re-evaluation      DISCHARGE MEDICATIONS:  Discharge Medication List as of 8/13/2021  6:43 PM      START taking these medications    Details   cefUROXime (CEFTIN) 250 MG tablet Take 1 tablet by mouth 2 times daily for 7 days, Disp-14 tablet, R-0Print      ondansetron (ZOFRAN) 4 MG tablet Take 1 tablet by mouth every 8 hours as needed for Nausea, Disp-20 tablet, R-0Print      dicyclomine (BENTYL) 10 MG capsule Take 1 capsule by mouth 4 times daily (before meals and nightly), Disp-120 capsule, R-3Print             DISCONTINUED MEDICATIONS:  Discharge Medication List as of 8/13/2021  6:43 PM                 (Please note that portions of this note were completed with a voice recognition program.  Efforts were made to edit the dictations but occasionally words are mis-transcribed.)    VIKI Mcduffie CNP (electronically signed)          VIKI Mcduffie CNP  08/13/21 1940

## 2021-08-15 LAB
HAV IGM SER IA-ACNC: ABNORMAL
HEPATITIS B CORE IGM ANTIBODY: ABNORMAL
HEPATITIS B SURFACE ANTIGEN INTERPRETATION: ABNORMAL
HEPATITIS C ANTIBODY INTERPRETATION: REACTIVE
ORGANISM: ABNORMAL
URINE CULTURE, ROUTINE: ABNORMAL

## 2021-08-16 NOTE — ED PROVIDER NOTES
I independently examined and evaluated Bridgett More. In brief, patient is a 76-year-old female presents to the emergency department for evaluation of fever and generalized waves of 6 days. Focused exam revealed clinically nontoxic appearing female interventions appropriately, afebrile, normotensive, and maintaining sat above 90% on room air. Patient denies any recent travel history, IV drug use, Tylenol use, alcohol use, which are common causes for elevated liver enzymes. Discussed the lesion seen on her kidney and she was instructed to follow-up with PCP for further evaluation and treatment. She is agreeable with plan and verbalized understanding. She was discharged home with strict return precautions. All diagnostic, treatment, and disposition decisions were made by myself in conjunction with the advanced practice provider. For all further details of the patient's emergency department visit, please see the advanced practice provider's documentation. Comment: Please note this report has been produced using speech recognition software and may contain errors related to that system including errors in grammar, punctuation, and spelling, as well as words and phrases that may be inappropriate. If there are any questions or concerns please feel free to contact the dictating provider for clarification.        Davis Maurer MD  08/16/21 6750

## 2022-03-22 SDOH — HEALTH STABILITY: PHYSICAL HEALTH: ON AVERAGE, HOW MANY DAYS PER WEEK DO YOU ENGAGE IN MODERATE TO STRENUOUS EXERCISE (LIKE A BRISK WALK)?: 5 DAYS

## 2022-03-22 SDOH — HEALTH STABILITY: PHYSICAL HEALTH: ON AVERAGE, HOW MANY MINUTES DO YOU ENGAGE IN EXERCISE AT THIS LEVEL?: 20 MIN

## 2022-03-23 ENCOUNTER — OFFICE VISIT (OUTPATIENT)
Dept: FAMILY MEDICINE CLINIC | Age: 72
End: 2022-03-23
Payer: MEDICARE

## 2022-03-23 ENCOUNTER — TELEPHONE (OUTPATIENT)
Dept: FAMILY MEDICINE CLINIC | Age: 72
End: 2022-03-23

## 2022-03-23 ENCOUNTER — HOSPITAL ENCOUNTER (OUTPATIENT)
Dept: CT IMAGING | Age: 72
Discharge: HOME | End: 2022-03-23
Payer: MEDICARE

## 2022-03-23 VITALS
OXYGEN SATURATION: 98 % | DIASTOLIC BLOOD PRESSURE: 70 MMHG | HEIGHT: 60 IN | HEART RATE: 79 BPM | WEIGHT: 137 LBS | SYSTOLIC BLOOD PRESSURE: 136 MMHG | BODY MASS INDEX: 26.9 KG/M2

## 2022-03-23 DIAGNOSIS — K29.50 CHRONIC GASTRITIS WITHOUT BLEEDING, UNSPECIFIED GASTRITIS TYPE: ICD-10-CM

## 2022-03-23 DIAGNOSIS — Z00.00 HEALTHCARE MAINTENANCE: ICD-10-CM

## 2022-03-23 DIAGNOSIS — R50.9 FEVER, UNSPECIFIED FEVER CAUSE: ICD-10-CM

## 2022-03-23 DIAGNOSIS — L81.9 DISCOLORATION OF SKIN OF MULTIPLE SITES OF LOWER EXTREMITY: ICD-10-CM

## 2022-03-23 DIAGNOSIS — N28.9 RENAL LESION: ICD-10-CM

## 2022-03-23 DIAGNOSIS — Z86.19 HISTORY OF HEPATITIS C: ICD-10-CM

## 2022-03-23 DIAGNOSIS — Z76.89 ENCOUNTER TO ESTABLISH CARE: Primary | ICD-10-CM

## 2022-03-23 DIAGNOSIS — D64.9 ANEMIA, UNSPECIFIED TYPE: Primary | ICD-10-CM

## 2022-03-23 DIAGNOSIS — I70.0 AORTIC CALCIFICATION (HCC): ICD-10-CM

## 2022-03-23 DIAGNOSIS — R53.83 FATIGUE, UNSPECIFIED TYPE: ICD-10-CM

## 2022-03-23 DIAGNOSIS — E11.69 TYPE 2 DIABETES MELLITUS WITH OTHER SPECIFIED COMPLICATION, WITHOUT LONG-TERM CURRENT USE OF INSULIN (HCC): ICD-10-CM

## 2022-03-23 LAB
A/G RATIO: 1.6 (ref 1.1–2.2)
ALBUMIN SERPL-MCNC: 4.4 G/DL (ref 3.4–5)
ALP BLD-CCNC: 62 U/L (ref 40–129)
ALT SERPL-CCNC: 67 U/L (ref 10–40)
ANION GAP SERPL CALCULATED.3IONS-SCNC: 16 MMOL/L (ref 3–16)
ANISOCYTOSIS: ABNORMAL
AST SERPL-CCNC: 76 U/L (ref 15–37)
BASOPHILS ABSOLUTE: 0 K/UL (ref 0–0.2)
BASOPHILS RELATIVE PERCENT: 0.4 %
BILIRUB SERPL-MCNC: <0.2 MG/DL (ref 0–1)
BUN BLDV-MCNC: 10 MG/DL (ref 7–20)
BUN BLDV-MCNC: 9 MG/DL (ref 7–20)
CALCIUM SERPL-MCNC: 9.5 MG/DL (ref 8.3–10.6)
CHLORIDE BLD-SCNC: 102 MMOL/L (ref 99–110)
CHOLESTEROL, FASTING: 189 MG/DL (ref 0–199)
CO2: 22 MMOL/L (ref 21–32)
CREAT SERPL-MCNC: 0.6 MG/DL (ref 0.6–1.2)
CREAT SERPL-MCNC: 0.6 MG/DL (ref 0.6–1.2)
EOSINOPHILS ABSOLUTE: 0.1 K/UL (ref 0–0.6)
EOSINOPHILS RELATIVE PERCENT: 0.7 %
ESTIMATED AVERAGE GLUCOSE: 137 MG/DL
GFR AFRICAN AMERICAN: >60
GFR NON-AFRICAN AMERICAN: >60
GLUCOSE BLD-MCNC: 102 MG/DL (ref 70–99)
HBA1C MFR BLD: 6.4 %
HCT VFR BLD CALC: 27.6 % (ref 36–48)
HDLC SERPL-MCNC: 108 MG/DL (ref 40–60)
HEMATOLOGY PATH CONSULT: YES
HEMOGLOBIN: 8.4 G/DL (ref 12–16)
HYPOCHROMIA: ABNORMAL
LDL CHOLESTEROL CALCULATED: 63 MG/DL
LYMPHOCYTES ABSOLUTE: 1.5 K/UL (ref 1–5.1)
LYMPHOCYTES RELATIVE PERCENT: 17.8 %
MCH RBC QN AUTO: 21.2 PG (ref 26–34)
MCHC RBC AUTO-ENTMCNC: 30.3 G/DL (ref 31–36)
MCV RBC AUTO: 69.8 FL (ref 80–100)
MICROCYTES: ABNORMAL
MONOCYTES ABSOLUTE: 0.5 K/UL (ref 0–1.3)
MONOCYTES RELATIVE PERCENT: 5.7 %
NEUTROPHILS ABSOLUTE: 6.4 K/UL (ref 1.7–7.7)
NEUTROPHILS RELATIVE PERCENT: 75.4 %
OVALOCYTES: ABNORMAL
PDW BLD-RTO: 18.8 % (ref 12.4–15.4)
PERFORMED ON: NORMAL
PLATELET # BLD: 182 K/UL (ref 135–450)
PMV BLD AUTO: 8.7 FL (ref 5–10.5)
POC CREATININE: 0.6 MG/DL (ref 0.6–1.2)
POC SAMPLE TYPE: NORMAL
POTASSIUM SERPL-SCNC: 4 MMOL/L (ref 3.5–5.1)
RBC # BLD: 3.95 M/UL (ref 4–5.2)
SCHISTOCYTES: ABNORMAL
SODIUM BLD-SCNC: 140 MMOL/L (ref 136–145)
T4 FREE: 1 NG/DL (ref 0.9–1.8)
TOTAL PROTEIN: 7.1 G/DL (ref 6.4–8.2)
TRIGLYCERIDE, FASTING: 90 MG/DL (ref 0–150)
TSH SERPL DL<=0.05 MIU/L-ACNC: 4.11 UIU/ML (ref 0.27–4.2)
VITAMIN D 25-HYDROXY: 42 NG/ML
VLDLC SERPL CALC-MCNC: 18 MG/DL
WBC # BLD: 8.4 K/UL (ref 4–11)

## 2022-03-23 PROCEDURE — 6360000004 HC RX CONTRAST MEDICATION: Performed by: NURSE PRACTITIONER

## 2022-03-23 PROCEDURE — 74170 CT ABD WO CNTRST FLWD CNTRST: CPT

## 2022-03-23 PROCEDURE — G0438 PPPS, INITIAL VISIT: HCPCS | Performed by: NURSE PRACTITIONER

## 2022-03-23 PROCEDURE — G8484 FLU IMMUNIZE NO ADMIN: HCPCS | Performed by: NURSE PRACTITIONER

## 2022-03-23 PROCEDURE — 82565 ASSAY OF CREATININE: CPT

## 2022-03-23 RX ADMIN — IOPAMIDOL 75 ML: 755 INJECTION, SOLUTION INTRAVENOUS at 11:29

## 2022-03-23 SDOH — ECONOMIC STABILITY: HOUSING INSECURITY: IN THE LAST 12 MONTHS, HOW MANY PLACES HAVE YOU LIVED?: 1

## 2022-03-23 SDOH — ECONOMIC STABILITY: HOUSING INSECURITY
IN THE LAST 12 MONTHS, WAS THERE A TIME WHEN YOU DID NOT HAVE A STEADY PLACE TO SLEEP OR SLEPT IN A SHELTER (INCLUDING NOW)?: NO

## 2022-03-23 SDOH — ECONOMIC STABILITY: FOOD INSECURITY: WITHIN THE PAST 12 MONTHS, THE FOOD YOU BOUGHT JUST DIDN'T LAST AND YOU DIDN'T HAVE MONEY TO GET MORE.: NEVER TRUE

## 2022-03-23 SDOH — ECONOMIC STABILITY: TRANSPORTATION INSECURITY
IN THE PAST 12 MONTHS, HAS THE LACK OF TRANSPORTATION KEPT YOU FROM MEDICAL APPOINTMENTS OR FROM GETTING MEDICATIONS?: NO

## 2022-03-23 SDOH — ECONOMIC STABILITY: TRANSPORTATION INSECURITY
IN THE PAST 12 MONTHS, HAS LACK OF TRANSPORTATION KEPT YOU FROM MEETINGS, WORK, OR FROM GETTING THINGS NEEDED FOR DAILY LIVING?: NO

## 2022-03-23 SDOH — ECONOMIC STABILITY: INCOME INSECURITY: IN THE LAST 12 MONTHS, WAS THERE A TIME WHEN YOU WERE NOT ABLE TO PAY THE MORTGAGE OR RENT ON TIME?: NO

## 2022-03-23 SDOH — ECONOMIC STABILITY: FOOD INSECURITY: WITHIN THE PAST 12 MONTHS, YOU WORRIED THAT YOUR FOOD WOULD RUN OUT BEFORE YOU GOT MONEY TO BUY MORE.: NEVER TRUE

## 2022-03-23 ASSESSMENT — PATIENT HEALTH QUESTIONNAIRE - PHQ9
7. TROUBLE CONCENTRATING ON THINGS, SUCH AS READING THE NEWSPAPER OR WATCHING TELEVISION: 0
6. FEELING BAD ABOUT YOURSELF - OR THAT YOU ARE A FAILURE OR HAVE LET YOURSELF OR YOUR FAMILY DOWN: 0
SUM OF ALL RESPONSES TO PHQ QUESTIONS 1-9: 9
SUM OF ALL RESPONSES TO PHQ QUESTIONS 1-9: 4
SUM OF ALL RESPONSES TO PHQ QUESTIONS 1-9: 4
SUM OF ALL RESPONSES TO PHQ QUESTIONS 1-9: 8
10. IF YOU CHECKED OFF ANY PROBLEMS, HOW DIFFICULT HAVE THESE PROBLEMS MADE IT FOR YOU TO DO YOUR WORK, TAKE CARE OF THINGS AT HOME, OR GET ALONG WITH OTHER PEOPLE: 3
SUM OF ALL RESPONSES TO PHQ QUESTIONS 1-9: 9
SUM OF ALL RESPONSES TO PHQ QUESTIONS 1-9: 9
SUM OF ALL RESPONSES TO PHQ QUESTIONS 1-9: 4
1. LITTLE INTEREST OR PLEASURE IN DOING THINGS: 1
SUM OF ALL RESPONSES TO PHQ9 QUESTIONS 1 & 2: 4
SUM OF ALL RESPONSES TO PHQ QUESTIONS 1-9: 4
4. FEELING TIRED OR HAVING LITTLE ENERGY: 3
8. MOVING OR SPEAKING SO SLOWLY THAT OTHER PEOPLE COULD HAVE NOTICED. OR THE OPPOSITE, BEING SO FIGETY OR RESTLESS THAT YOU HAVE BEEN MOVING AROUND A LOT MORE THAN USUAL: 0
9. THOUGHTS THAT YOU WOULD BE BETTER OFF DEAD, OR OF HURTING YOURSELF: 1
2. FEELING DOWN, DEPRESSED OR HOPELESS: 3
5. POOR APPETITE OR OVEREATING: 2
3. TROUBLE FALLING OR STAYING ASLEEP: 3

## 2022-03-23 ASSESSMENT — SOCIAL DETERMINANTS OF HEALTH (SDOH): HOW HARD IS IT FOR YOU TO PAY FOR THE VERY BASICS LIKE FOOD, HOUSING, MEDICAL CARE, AND HEATING?: NOT HARD AT ALL

## 2022-03-23 NOTE — PROGRESS NOTES
3/23/2022    Eron Stephenson (:  1950) is a 66 y.o. female, here for a preventive medicine evaluation. Pt is here today with her  to establish care. Her previous PCP was Dr. Elliott Cruz at [facility]. Her last OV with him was about 6 months ago. We do not have any previous medical records. . 3 children, 1 child passed away several years ago d/t a heart condition. Denies alcohol, drug or tobacco use. Pt states that she used to be an alcoholic, but has not drank in 32 years. She used to do a lot of out pt counseling after her recovery. Takes omeprazole and Carafate for liver bile gastritis. Follows Dr. Bushra Browning (GI) in [place], but she would like a new referral to someone closer. We do not have any previous records to review. States that she has to get an EGD every 2 years for \"pre cancer in her stomach lining\". Takes losartan 25 mg daily. States that she was prescribed this to protect her kidneys. Denies h/o HTN. Takes sertraline as directed for anxiety and depression. Symptoms are stable. Takes metformin 500 mg once a day for diabetes. She is not sure what her previous A1C was, but thinks that it was around 6. She states that her last colonoscopy was 8-9 years ago and was normal.    She has not had a mammogram in several years. States that she does not want screening. Denies family h/o colon or breast cancer. Had a partial hysterectomy. Has not had a gynecological exam in several years. Defers f/u. Pt was seen in the ER 2021 for fever and vomiting. She states that she is no longer vomiting, but still has a low grade fever which she feels is worsening and is feeling more fatigued. Hepatitis panel showed reactive Hep C.  Pt states that she had a h/o Hep C about 15 years ago and was told that she cured herself. ED notes from 2021:  Patient was seen and evaluated by myself and Dr. Deirdre Rodriguez.   Patient here for concerns for the fever. Patient states that she has not felt well for the last 6 days. She reports she was starting to feel little bit better however 3 days ago started developing nausea and vomiting. Patient states that she has had some diarrhea but denies any C. difficile risk factors. On exam the patient is awake and alert hemodynamically stable nontoxic in appearance. Lab values have been reviewed and interpreted. Patient was found to have elevated liver enzymes. She denies any alcohol use history of hepatitis or pancreatitis. Abdominal CT was obtained and was also concerning for pyelonephritis or lesion to her left kidney. Patient will be treated for UTI with Ceftin. She was given a dose in the ED and a prescription for home use. Patient was requesting a new primary care doctor and was given a referral.  She was encouraged to follow-up and establish care. Patient was also given a GI referral.  Patient was discharged home with Bentyl Zofran and Carafate prescriptions in addition to her antibiotics. She was encouraged to return to the ED for worsening symptoms. Patient was ultimately discharged home with all questions answered. CT abdomen pelvis:  Impression   Inhomogeneous left nephrogram, suggestive of pyelonephritis.  1.6 cm complex   cyst versus abscess in the superior pole of the left kidney.  Follow-up   imaging is recommended (for instance CT renal mass protocol in 3 months).       The attenuation of the liver is normal.  There is post cholecystectomy   prominence of the common duct, most likely due to reservoir effect. Depending on degree of clinical suspicion, follow-up MRCP/ERCP may be   considered. Pt states that she did f/u with Dr. Mark Yap and had further testing done. We do not have any records or notes. Pt brought in previous fasting lab results from 3/1/21 - will scan in chart.   TC- 181  Triglycerides - 82  HDL - 99  LDL - 66  ALT- 66  AST - 39  Glucose - 116    Abdomen/Plevis CT also showed:    Peritoneum/Retroperitoneum: There is moderate aortoiliac calcification,   without aneurysm.  No adenopathy is seen. Pt states that she was not aware of the above results. States that she has had mottling of her hands and feet for the past few weeks. States that her BLE also feel very heavy and has had some swelling in her ankles     Patient Active Problem List   Diagnosis    Tailor's bunion    Pes anserinus bursitis of right knee    Primary osteoarthritis of right knee    Impingement syndrome of left shoulder    Primary osteoarthritis of left shoulder    Tear of left rotator cuff    Kyle's disease (erythromelalgia) ([facility] [address]       Review of Systems   All other systems reviewed and are negative. Prior to Visit Medications    Medication Sig Taking?  Authorizing Provider   sucralfate (CARAFATE) 1 GM tablet Take 1 tablet by mouth 4 times daily Yes VIKI Carter - CNP   rosuvastatin (CRESTOR) 20 MG tablet TAKE 1 TABLET BY MOUTH EVERY DAY Yes Historical Provider, MD   losartan (COZAAR) 25 MG tablet TAKE 1 TABLET BY MOUTH DAILY Yes Historical Provider, MD   sertraline (ZOLOFT) 50 MG tablet Take 100 mg by mouth 2 times daily  Yes Historical Provider, MD   Probiotic Product (PROBIOTIC PO) Take by mouth daily Yes Historical Provider, MD   aspirin 81 MG tablet Take 81 mg by mouth daily Yes Historical Provider, MD   ACCU-CHEK SMARTVIEW strip  Yes Historical Provider, MD   metFORMIN ER (GLUCOPHAGE-XR) 500 MG XR tablet daily (with breakfast)  Yes Historical Provider, MD   omeprazole (PRILOSEC) 20 MG capsule Take 20 mg by mouth Daily  Yes Historical Provider, MD        Allergies   Allergen Reactions    Latex Rash     Elastic waste bands/ok rubber bands/gloves react/  No SOB or resp problems --- JUST SKIN REACTION    Lisinopril Swelling     angioedema    Codeine Other (See Comments)     Chest pain    Nickel Rash       Past Medical History:   Diagnosis Date    Transportation (Non-Medical): No   Physical Activity: Insufficiently Active    Days of Exercise per Week: 5 days    Minutes of Exercise per Session: 20 min   Stress:     Feeling of Stress : Not on file   Social Connections:     Frequency of Communication with Friends and Family: Not on file    Frequency of Social Gatherings with Friends and Family: Not on file    Attends Restoration Services: Not on file    Active Member of Clubs or Organizations: Not on file    Attends Club or Organization Meetings: Not on file    Marital Status: Not on file   Intimate Partner Violence: Not At Risk    Fear of Current or Ex-Partner: No    Emotionally Abused: No    Physically Abused: No    Sexually Abused: No   Housing Stability: Low Risk     Unable to Pay for Housing in the Last Year: No    Number of Places Lived in the Last Year: 1    Unstable Housing in the Last Year: No        Family History   Problem Relation Age of Onset    Severe Sprains Brother     Heart Disease Brother     Cancer Paternal Aunt     Osteoporosis Paternal Uncle     Cancer Mother         esophageal    Heart Disease Father     Diabetes Father     Heart Disease Son        ADVANCE DIRECTIVE: N, <no information>    Vitals:    03/23/22 0836 03/23/22 0944   BP: (!) 140/68 136/70   Site: Left Upper Arm Left Upper Arm   Position: Sitting Sitting   Cuff Size: Medium Adult Medium Adult   Pulse: 79    SpO2: 98%    Weight: 137 lb (62.1 kg)    Height: 5' (1.524 m)      Estimated body mass index is 26.76 kg/m² as calculated from the following:    Height as of this encounter: 5' (1.524 m). Weight as of this encounter: 137 lb (62.1 kg). Physical Exam  Vitals reviewed. Constitutional:       Appearance: Normal appearance. HENT:      Head: Normocephalic. Neck:      Thyroid: No thyroid mass, thyromegaly or thyroid tenderness. Vascular: No carotid bruit. Cardiovascular:      Rate and Rhythm: Normal rate and regular rhythm.       Pulses: Normal pulses. Heart sounds: Normal heart sounds. Pulmonary:      Effort: Pulmonary effort is normal.      Breath sounds: Normal breath sounds. Abdominal:      General: Abdomen is flat. Bowel sounds are normal. There is no distension. Palpations: Abdomen is soft. There is no mass. Tenderness: There is no abdominal tenderness. There is no right CVA tenderness or left CVA tenderness. Musculoskeletal:      Cervical back: Normal range of motion. Lymphadenopathy:      Cervical: No cervical adenopathy. Skin:     General: Skin is warm and dry. Neurological:      Mental Status: She is alert and oriented to person, place, and time. Psychiatric:         Mood and Affect: Mood normal.         Behavior: Behavior normal.         Thought Content: Thought content normal.         Judgment: Judgment normal.         No flowsheet data found. Lab Results   Component Value Date    CHOL 181 03/01/2021    CHOLFAST 189 03/23/2022    TRIG 82 03/01/2021    TRIGLYCFAST 90 03/23/2022     03/23/2022    HDL 99 03/01/2021    LDLCALC 63 03/23/2022    LDLCALC 66 03/01/2021    GLUCOSE 102 03/23/2022       The ASCVD Risk score (Pratima Gaines., et al., 2013) failed to calculate for the following reasons:     The valid HDL cholesterol range is 20 to 100 mg/dL    Immunization History   Administered Date(s) Administered    COVID-19, Pfizer Purple top, DILUTE for use, 12+ yrs, 30mcg/0.3mL dose 12/06/2021       Health Maintenance   Topic Date Due    Colorectal Cancer Screen  Never done    Breast cancer screen  Never done    Shingles Vaccine (1 of 2) Never done    DEXA (modify frequency per FRAX score)  Never done    Pneumococcal 65+ years Vaccine (2 of 2 - PPSV23) 09/03/2016    Annual Wellness Visit (AWV)  Never done    COVID-19 Vaccine (2 - Pfizer 3-dose series) 12/27/2021    Lipid screen  03/23/2023    Depression Screen  03/23/2023    Potassium monitoring  03/23/2023    Creatinine monitoring  03/23/2023    [place]    Type 2 diabetes mellitus with other specified complication, without long-term current use of insulin ([address]  No previous labs or notes to review. Will check A1C. If normal, may consider discontinuing the metformin since she is only taking 500 mg once a day. Will also discuss continuing Losartan which she states that she was prescribed to help with kidney function since she's a diabetic.       - A1C    Chronic gastritis without bleeding, unspecified gastritis type  ANNA completed to get previous records from GI and previous PCP. New referral to GI provided per pt's request.      -     Burke Hadley MD, Gastroenterology, [facility]    Aortic calcification [facility])  See HPI. Incidental finding on CT abdomen/pelvis done in the ER 8/2021 (Moderate aortoiliac calcification). Pt c/o BLE \"mottling\" and \"feeling heavy\". Will refer to vascular surgery for further evaluation.      -     Andrew Albarran MD, Vascular Surgery, [facility]    Discoloration of skin of multiple sites of lower extremity  See above. -     Andrew Albarran MD, Vascular Surgery, [facility]    Fever, unspecified fever cause  Could be d/t Hep C. Will check labs and f/u with pt regarding results and POC.    -     CBC with Auto Differential  -     Hepatitis C RNA, quantitative, PCR    Fatigue, unspecified type  Could be d/t Hep C. Will check labs and f/u with pt regarding results and POC.    -     CBC with Auto Differential  -     Hepatitis C RNA, quantitative, PCR  -     T4, Free  -     TSH  -     Vitamin D 25 Hydroxy    Return for TBD. I spent over 60 minutes with this pt. I need to review all previous medical records. Pt may need to come back in for another OV to review together.       --Mode Brito, APRN - CNP

## 2022-03-23 NOTE — TELEPHONE ENCOUNTER
Scheduling calling stating they need the order changed to CT W/WO contrast and need STAT BUN/CREAT orders    Call Mari in scheduling when this is completed [phone]

## 2022-03-24 ENCOUNTER — TELEPHONE (OUTPATIENT)
Dept: FAMILY MEDICINE CLINIC | Age: 72
End: 2022-03-24

## 2022-03-24 DIAGNOSIS — Z86.19 HISTORY OF HEPATITIS C: Primary | ICD-10-CM

## 2022-03-24 LAB
FERRITIN: 11.9 NG/ML (ref 15–150)
HEMATOLOGY PATH CONSULT: NORMAL
IRON SATURATION: 4 % (ref 15–50)
IRON: 20 UG/DL (ref 37–145)
TOTAL IRON BINDING CAPACITY: 476 UG/DL (ref 260–445)
VITAMIN B-12: 1022 PG/ML (ref 211–911)

## 2022-03-24 NOTE — TELEPHONE ENCOUNTER
Spoke with lab regarding the Hep C RNA that was ordered yesterday 3/23. They are unable to add this on with the way it's ordered now, but if it can be changed to [id number] they can add it. Please advise.

## 2022-03-25 DIAGNOSIS — Z86.19 HISTORY OF HEPATITIS C: ICD-10-CM

## 2022-03-28 DIAGNOSIS — I10 ESSENTIAL (PRIMARY) HYPERTENSION: ICD-10-CM

## 2022-03-28 DIAGNOSIS — I10 BENIGN ESSENTIAL HYPERTENSION: ICD-10-CM

## 2022-03-28 RX ORDER — LOSARTAN POTASSIUM 25 MG/1
TABLET ORAL
Qty: 90 TABLET | Refills: 1 | Status: SHIPPED | OUTPATIENT
Start: 2022-03-28

## 2022-03-28 NOTE — TELEPHONE ENCOUNTER
Refill Request     Last Seen: Last Seen Department: 3/23/2022  Last Seen by PCP: 3/23/2022    Last Written: Historical Provider     Next Appointment:   Future Appointments   Date Time Provider Tania Mitchell   4/8/2022 10:30 AM João Yang MD AND DIONICIO MCCORMACK             Requested Prescriptions     Pending Prescriptions Disp Refills    losartan (COZAAR) 25 MG tablet [Pharmacy Med Name: LOSARTAN POTASSIUM 25 MG TAB] 90 tablet 1     Sig: TAKE 1 TABLET BY MOUTH EVERY DAY

## 2022-03-29 ENCOUNTER — TELEPHONE (OUTPATIENT)
Dept: FAMILY MEDICINE CLINIC | Age: 72
End: 2022-03-29

## 2022-03-29 ENCOUNTER — PATIENT MESSAGE (OUTPATIENT)
Dept: FAMILY MEDICINE CLINIC | Age: 72
End: 2022-03-29

## 2022-03-29 NOTE — TELEPHONE ENCOUNTER
From: Danilo GRANT  To: Sanjana Cuellaro  Sent: 3/28/2022 11:05 AM EDT  Subject: Mammogram update    Hello,     Our records show you are due for a mammogram. If you have had this completed please reply and tell us where your mammogram was done so that I can update your health record. If you have not had your mammogram but wish to you can call [phone] to schedule. This is the [id number] [facility] central scheduling number. When you call they can schedule your mammogram without an order. They will also schedule you for your mammogram at an imaging center close to your home. If you wish to have your mammogram at the [address] [place] [address] walk in appointments are also welcome. If you need help to schedule this please reply to this message and our staff can help get you scheduled. Please let the staff know if mornings or afternoons work best for you and if there is a certain day you can not go. Please also let the staff know what location you prefer if it is not the location here at the [facility] office. If you have any additional questions please contact the office.      Thank you and have a great day,

## 2022-04-01 LAB
HCV QNT BY NAAT IU/ML: NOT DETECTED IU/ML
HCV QNT BY NAAT LOG IU/ML: NOT DETECTED LOG IU/ML
INTERPRETATION: NOT DETECTED

## 2022-04-08 ENCOUNTER — OFFICE VISIT (OUTPATIENT)
Dept: VASCULAR SURGERY | Age: 72
End: 2022-04-08
Payer: MEDICARE

## 2022-04-08 VITALS
HEIGHT: 60 IN | WEIGHT: 139.5 LBS | BODY MASS INDEX: 27.39 KG/M2 | SYSTOLIC BLOOD PRESSURE: 118 MMHG | DIASTOLIC BLOOD PRESSURE: 60 MMHG

## 2022-04-08 DIAGNOSIS — I70.0 ATHEROSCLEROSIS OF ABDOMINAL AORTA (HCC): Primary | ICD-10-CM

## 2022-04-08 PROCEDURE — 99203 OFFICE O/P NEW LOW 30 MIN: CPT | Performed by: SURGERY

## 2022-04-08 PROCEDURE — G8427 DOCREV CUR MEDS BY ELIG CLIN: HCPCS | Performed by: SURGERY

## 2022-04-08 PROCEDURE — G8417 CALC BMI ABV UP PARAM F/U: HCPCS | Performed by: SURGERY

## 2022-04-08 PROCEDURE — 1090F PRES/ABSN URINE INCON ASSESS: CPT | Performed by: SURGERY

## 2022-04-08 NOTE — PROGRESS NOTES
Outpatient Consultation / H&P    Date of Consultation:  4/8/2022    PCP:  VIKI Lynn CNP     Referring Provider:  Dior Ryan     Chief Complaint:   Chief Complaint   Patient presents with    Other     pat ref by Dior Ryan CNP for aortic calcification/discoloration of skin. pamlr        History of Present Illness: We are asked to see this patient in consultation by Dior Ryan regarding CT findings of aortic/iliac calcifications. Archie Recio is a 66 y.o. female who underwent a  CT scan of abdominal/pelivs as a f/u to prior CT which had shown a possible renal cyst/abscess. Previous abnormalities resolved, however \"vascular calcifications\" noted in abdominal aorta. Patient has no symptoms of claudication or rest pain. She has no ischemic ulcerations on legs or feet. Past Medical History:  Past Medical History:   Diagnosis Date    Diabetes mellitus ([address]     Gastritis     GERD (gastroesophageal reflux disease)     Hepatitis C antibody positive in blood 08/13/2021    Hyperlipidemia     Neuralgia     secondary to shingles    Pes anserinus bursitis of right knee 02/15/2016    Primary osteoarthritis of right knee 02/15/2016       Past Surgical History:  Past Surgical History:   Procedure Laterality Date    ANKLE FRACTURE SURGERY Right     BREAST SURGERY Right     lumpectomy    BUNIONECTOMY Right     CARPAL TUNNEL RELEASE Bilateral     CHOLECYSTECTOMY      HYSTERECTOMY      JOINT REPLACEMENT Right 2004    TKR    KNEE SURGERY Right     OTHER SURGICAL HISTORY      evacuation hematoma right buttocks    SHOULDER SURGERY Right     SHOULDER SURGERY Left 04/25/2018    rotator cuff repair    TUBAL LIGATION         Home Medications:   Prior to Admission medications    Medication Sig Start Date End Date Taking?  Authorizing Provider   losartan (COZAAR) 25 MG tablet TAKE 1 TABLET BY MOUTH EVERY DAY 3/28/22  Yes VIKI Laboy CNP   sucralfate (CARAFATE) 1 GM tablet Take 1 tablet by mouth 4 times daily 21  Yes VIKI Sorenson CNP   rosuvastatin (CRESTOR) 20 MG tablet TAKE 1 TABLET BY MOUTH EVERY DAY 20  Yes Historical Provider, MD   sertraline (ZOLOFT) 50 MG tablet Take 100 mg by mouth 2 times daily  16  Yes Historical Provider, MD   aspirin 81 MG tablet Take 81 mg by mouth daily   Yes Historical Provider, MD   ACCU-CHEK SMARTVIEW strip  7/13/15  Yes Historical Provider, MD   metFORMIN ER (GLUCOPHAGE-XR) 500 MG XR tablet daily (with breakfast)  4/12/15  Yes Historical Provider, MD   omeprazole (PRILOSEC) 20 MG capsule Take 20 mg by mouth Daily  4/12/15  Yes Historical Provider, MD   Probiotic Product (PROBIOTIC PO) Take by mouth daily  Patient not taking: Reported on 2022    Historical Provider, MD        Allergies:  Latex, Lisinopril, Codeine, and Nickel      Social History:      Social History     Socioeconomic History    Marital status:      Spouse name: Not on file    Number of children: Not on file    Years of education: Not on file    Highest education level: Not on file   Occupational History    Not on file   Tobacco Use    Smoking status: Former Smoker     Packs/day: 0.50     Years: 30.00     Pack years: 15.00     Quit date: 2019     Years since quittin.6    Smokeless tobacco: Never Used    Tobacco comment: pt quitting 1.5 pack a month    Vaping Use    Vaping Use: Not on file   Substance and Sexual Activity    Alcohol use: No     Alcohol/week: 0.0 standard drinks    Drug use: No    Sexual activity: Yes     Partners: Male   Other Topics Concern    Not on file   Social History Narrative    Not on file     Social Determinants of Health     Financial Resource Strain: Low Risk     Difficulty of Paying Living Expenses: Not hard at all   Food Insecurity: No Food Insecurity    Worried About Running Out of Food in the Last Year: Never true    Nataly of Food in the Last Year: Never true   Transportation Needs: No Transportation Needs    Lack of Transportation (Medical): No    Lack of Transportation (Non-Medical): No   Physical Activity: Insufficiently Active    Days of Exercise per Week: 5 days    Minutes of Exercise per Session: 20 min   Stress:     Feeling of Stress : Not on file   Social Connections:     Frequency of Communication with Friends and Family: Not on file    Frequency of Social Gatherings with Friends and Family: Not on file    Attends Orthodoxy Services: Not on file    Active Member of Clubs or Organizations: Not on file    Attends Club or Organization Meetings: Not on file    Marital Status: Not on file   Intimate Partner Violence: Not At Risk    Fear of Current or Ex-Partner: No    Emotionally Abused: No    Physically Abused: No    Sexually Abused: No   Housing Stability: Low Risk     Unable to Pay for Housing in the Last Year: No    Number of Places Lived in the Last Year: 1    Unstable Housing in the Last Year: No       Family History:        Problem Relation Age of Onset    Severe Sprains Brother     Heart Disease Brother     Cancer Paternal Aunt     Osteoporosis Paternal Uncle     Cancer Mother         esophageal    Heart Disease Father     Diabetes Father     Heart Disease Son        Review of Systems:  A 14 point review of systems was completed. Pertinent positives identified in the HPI, all other review of systems negative. Physical Examination:    /60 (Site: Right Upper Arm)   Ht 5' (1.524 m)   Wt 139 lb 8 oz (63.3 kg)   BMI 27.24 kg/m²     Weight: 139 lb 8 oz (63.3 kg)       General appearance: NAD  Eyes: PERRLA  Neck: no JVD, no lymphadenopathy. Respiratory: effort is unlabored, no crackles, wheezes or rubs. Cardiovascular: regular, no murmur. No carotid bruits. Pulses:    carotid brachial radial femoral DP PT   RIGHT 2  2 2 - 2   LEFT 2  2 2 1 2   GI: abdomen soft, nondistended, no organomegaly.   Musculoskeletal: strength and tone normal.  Extremities: warm and pink. Skin: no dermatitis or ulceration. Neuro/psychiatric: grossly intact. MEDICAL DECISION MAKING/TESTING        CT: 3/23/3022  Images personally reviewed and interpreted. Aortic and iliacs with scattered calcified plaque. No stenosis, thrombosis, or aneurysm. Assessment:      Diagnosis Orders   1. Atherosclerosis of abdominal aorta (HCC)       Asymptomatic. Recommendations/Plan:    No indications for further testing or intervention. Recc daily ASA and statin.        Madina Mays MD, FACS

## 2022-05-17 DIAGNOSIS — L40.50 PSORIATIC ARTHRITIS (HCC): Primary | ICD-10-CM

## 2022-06-03 ENCOUNTER — OFFICE VISIT (OUTPATIENT)
Dept: FAMILY MEDICINE CLINIC | Age: 72
End: 2022-06-03
Payer: MEDICARE

## 2022-06-03 ENCOUNTER — TELEPHONE (OUTPATIENT)
Dept: FAMILY MEDICINE CLINIC | Age: 72
End: 2022-06-03

## 2022-06-03 VITALS
TEMPERATURE: 98.7 F | HEIGHT: 60 IN | WEIGHT: 135 LBS | OXYGEN SATURATION: 95 % | HEART RATE: 86 BPM | SYSTOLIC BLOOD PRESSURE: 124 MMHG | DIASTOLIC BLOOD PRESSURE: 80 MMHG | BODY MASS INDEX: 26.5 KG/M2

## 2022-06-03 DIAGNOSIS — S70.361A TICK BITE OF RIGHT THIGH, INITIAL ENCOUNTER: Primary | ICD-10-CM

## 2022-06-03 DIAGNOSIS — Z79.2 PROPHYLACTIC ANTIBIOTIC: ICD-10-CM
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
## 2022-06-03 PROCEDURE — G8427 DOCREV CUR MEDS BY ELIG CLIN: HCPCS | Performed by: NURSE PRACTITIONER

## 2022-06-03 PROCEDURE — 99213 OFFICE O/P EST LOW 20 MIN: CPT | Performed by: NURSE PRACTITIONER

## 2022-06-03 PROCEDURE — 1123F ACP DISCUSS/DSCN MKR DOCD: CPT | Performed by: NURSE PRACTITIONER

## 2022-06-03 PROCEDURE — 1090F PRES/ABSN URINE INCON ASSESS: CPT | Performed by: NURSE PRACTITIONER

## 2022-06-03 PROCEDURE — 3017F COLORECTAL CA SCREEN DOC REV: CPT | Performed by: NURSE PRACTITIONER

## 2022-06-03 PROCEDURE — G8400 PT W/DXA NO RESULTS DOC: HCPCS | Performed by: NURSE PRACTITIONER

## 2022-06-03 PROCEDURE — G8417 CALC BMI ABV UP PARAM F/U: HCPCS | Performed by: NURSE PRACTITIONER

## 2022-06-03 PROCEDURE — 1036F TOBACCO NON-USER: CPT | Performed by: NURSE PRACTITIONER

## 2022-06-03 RX ORDER — DOXYCYCLINE HYCLATE 100 MG
100 TABLET ORAL 2 TIMES DAILY
Qty: 14 TABLET | Refills: 0 | Status: SHIPPED | OUTPATIENT
Start: 2022-06-03 | End: 2022-06-10

## 2022-06-03 ASSESSMENT — ENCOUNTER SYMPTOMS
GASTROINTESTINAL NEGATIVE: 1
COLOR CHANGE: 0
RESPIRATORY NEGATIVE: 1

## 2022-06-03 NOTE — PROGRESS NOTES
6/3/2022     Augusta Jimenez (:  1950) is a 66 y.o. female, here for evaluation of the following medical concerns:    HPI  Pt states that she found a tick embedded in her right upper thigh last night. She is not sure how long it was there. States that she picked it off. The tick is very small, she brought it in with her today. States that she believes that the tick is a nymphal tick d/t the size. States that the area around the tick bite is erythematous. Denies fever, chills, N/V, SOB or wheezing. Review of Systems   Constitutional: Negative. Respiratory: Negative. Cardiovascular: Negative. Gastrointestinal: Negative. Skin: Positive for wound. Negative for color change, pallor and rash. Neurological: Negative. Psychiatric/Behavioral: Negative. Prior to Visit Medications    Medication Sig Taking?  Authorizing Provider   doxycycline hyclate (VIBRA-TABS) 100 MG tablet Take 1 tablet by mouth 2 times daily for 7 days Yes Tereza Ruff APRN - CNP   losartan (COZAAR) 25 MG tablet TAKE 1 TABLET BY MOUTH EVERY DAY Yes Heidi Calderon APRN - CNP   sucralfate (CARAFATE) 1 GM tablet Take 1 tablet by mouth 4 times daily Yes Charla Carrasco APRN - CNP   rosuvastatin (CRESTOR) 20 MG tablet TAKE 1 TABLET BY MOUTH EVERY DAY Yes Historical Provider, MD   sertraline (ZOLOFT) 50 MG tablet Take 100 mg by mouth 2 times daily  Yes Historical Provider, MD   aspirin 81 MG tablet Take 81 mg by mouth daily Yes Historical Provider, MD   ACCU-CHEK SMARTVIEW strip  Yes Historical Provider, MD   metFORMIN ER (GLUCOPHAGE-XR) 500 MG XR tablet daily (with breakfast)  Yes Historical Provider, MD   omeprazole (PRILOSEC) 20 MG capsule Take 20 mg by mouth Daily  Yes Historical Provider, MD   Probiotic Product (PROBIOTIC PO) Take by mouth daily  Patient not taking: Reported on 2022  Historical Provider, MD        Social History     Tobacco Use    Smoking status: Former Smoker     Packs/day: 0.50 Years: 30.00     Pack years: 15.00     Quit date: 2019     Years since quittin.7    Smokeless tobacco: Never Used    Tobacco comment: pt quitting 1.5 pack a month    Substance Use Topics    Alcohol use: No     Alcohol/week: 0.0 standard drinks        Vitals:    22 1606   BP: 124/80   Site: Right Upper Arm   Position: Sitting   Cuff Size: Medium Adult   Pulse: 86   Temp: 98.7 °F (37.1 °C)   SpO2: 95%   Weight: 135 lb (61.2 kg)   Height: 5' (1.524 m)     Estimated body mass index is 26.37 kg/m² as calculated from the following:    Height as of this encounter: 5' (1.524 m). Weight as of this encounter: 135 lb (61.2 kg). Physical Exam  Vitals reviewed. Constitutional:       Appearance: Normal appearance. HENT:      Head: Normocephalic. Pulmonary:      Effort: Pulmonary effort is normal.   Skin:     General: Skin is warm and dry. Comments: See picture. Neurological:      General: No focal deficit present. Mental Status: She is alert and oriented to person, place, and time. Psychiatric:         Mood and Affect: Mood normal.         Behavior: Behavior normal.         Thought Content: Thought content normal.         Judgment: Judgment normal.        Media Information             Document Information    Dermatology Image:  Dermatology Photo      2022 16:31   Attached To: Office Visit on 6/3/22 with PowerDsine, APRN - newMentor, APRN - CNP  [facility]       Media Information             Document Information    Dermatology Image:  Tick on piece of tape that pt brought in.      2022 16:30   Attached To: Office Visit on 6/3/22 with PowerDsine, APRN - [address], APRN - CNP  [facility]         ASSESSMENT/PLAN:  1. Tick bite of right thigh, initial encounter  Will treat the pt prophylactically with doxycycline. Advised pt to f/u as needed.   Verbal and written instructions given to pt on s/s to watch for.  - doxycycline hyclate (VIBRA-TABS) 100 MG tablet; Take 1 tablet by mouth 2 times daily for 7 days  Dispense: 14 tablet; Refill: 0    2. Prophylactic antibiotic    - doxycycline hyclate (VIBRA-TABS) 100 MG tablet; Take 1 tablet by mouth 2 times daily for 7 days  Dispense: 14 tablet; Refill: 0      Return if symptoms worsen or fail to improve. An electronic signature was used to authenticate this note.     --VIKI Raymundo - CNP on 6/3/2022 at 4:42 PM

## 2022-06-03 NOTE — PATIENT INSTRUCTIONS
Patient Education        Tick Bite: Care Instructions  Overview     Ticks are small spiderlike animals. They bite to fasten themselves onto yourskin and feed on your blood. Ticks can carry diseases. But most ticks do not carry diseases, and most tickbites do not cause serious health problems. Some people may have an allergic reaction to a tick bite. This reaction may be mild, with symptoms like itching and swelling. In rare cases, a severe allergicreaction may occur. Most of the time, all you need to do for a tick bite is relieve any symptomsyou may have. Follow-up care is a key part of your treatment and safety. Be sure to make and go to all appointments, and call your doctor if you are having problems. It's also a good idea to know your test results and keep alist of the medicines you take. How can you care for yourself at home?  Put ice or a cold pack on the bite for 15 to 20 minutes once an hour. Put a thin cloth between the ice and your skin.  Try an over-the-counter medicine to relieve itching, redness, swelling, and pain. Be safe with medicines. Read and follow all instructions on the label. ? Take an antihistamine medicine to help relieve itching, redness, and swelling. ? Use a spray of local anesthetic that contains benzocaine, such as Solarcaine. It may help relieve pain. If your skin reacts to the spray, stop using it. ? Put calamine lotion on the skin. It may help relieve itching. To avoid tick bites   Avoid ticks:  ? Learn where ticks are found in your community, and stay away from those areas if possible. ? Cover as much of your body as possible when you work or play in grassy or wooded areas. ? Use insect repellents, such as products containing DEET. You can spray them on your skin. ? Take steps to control ticks on your property if you live in an area where Lyme disease occurs.  Clear leaves, brush, tall grasses, woodpiles, and stone fences from around your house and the edges of your yard or garden. This may help get rid of ticks.  When you come in from outdoors, check your body for ticks, including your groin, head, and underarms. The ticks may be about the size of a sesame seed. If no one else can help you check for ticks on your scalp, comb your hair with a fine-tooth comb.  If you find a tick, remove it quickly. Use tweezers to grasp the tick as close to its mouth (the part in your skin) as possible. Slowly pull the tick straight out--do not twist or yank--until its mouth releases from your skin. If part of the tick stays in the skin, leave it alone. It will likely come out on its own in a few days.  Ticks can come into your house on clothing, outdoor gear, and pets. These ticks can fall off and attach to you. ? Check your clothing and outdoor gear. Remove any ticks you find. Then put your clothing in a clothes dryer on high heat for about 4 minutes to kill any ticks that might remain. ? Check your pets for ticks after they have been outdoors. When should you call for help? Call 911 anytime you think you may need emergency care. For example, call if:     You have symptoms of a severe allergic reaction. These may include:  ? Sudden raised, red areas (hives) all over your body. ? Swelling of the throat, mouth, lips, or tongue. ? Trouble breathing. ? Passing out (losing consciousness). Or you may feel very lightheaded or suddenly feel weak, confused, or restless. Call your doctor now or seek immediate medical care if:     You have signs of infection, such as:  ? Increased pain, swelling, warmth, or redness around the bite. ? Red streaks leading from the bite. ? Pus draining from the bite. ? A fever. Watch closely for changes in your health, and be sure to contact your doctor if:     You develop a new rash.      You have joint pain.      You are very tired.      You have flu-like symptoms.      You have symptoms for more than 1 week.    Where can you learn

## 2022-06-03 NOTE — TELEPHONE ENCOUNTER
Pt called in and found a tick embedded in her thigh last night and pt kept the tick and wants to know if she should bring it in for it to be tested for lyme disease when she has her appt this afternoon. Please advise.

## 2022-06-07 ENCOUNTER — TELEPHONE (OUTPATIENT)
Dept: FAMILY MEDICINE CLINIC | Age: 72
End: 2022-06-07

## 2022-06-07 NOTE — TELEPHONE ENCOUNTER
Patient called stating she started taking ATB Friday night, ATB states to stay out of sun. Patient states that Sunday she was outside wearing saddles for about 20 mins or so and the tops of her toes on bilateral feet are bright red. Today she states there are small blisters that have popped up. Writer spoke with provider, advised Patient that she would need to set up an office visit or go to the Urgent care. Patient states she will go to the urgent care this evening.

## 2022-06-24 ENCOUNTER — ANESTHESIA EVENT (OUTPATIENT)
Dept: ENDOSCOPY | Age: 72
End: 2022-06-24
Payer: MEDICARE

## 2022-06-24 RX ORDER — ROSUVASTATIN CALCIUM 20 MG/1
TABLET, COATED ORAL
Qty: 30 TABLET | Refills: 5 | Status: SHIPPED | OUTPATIENT
Start: 2022-06-24

## 2022-06-24 RX ORDER — OMEPRAZOLE 20 MG/1
20 CAPSULE, DELAYED RELEASE ORAL DAILY
Qty: 30 CAPSULE | Refills: 5 | Status: SHIPPED | OUTPATIENT
Start: 2022-06-24

## 2022-06-24 NOTE — PROGRESS NOTES
..1.  Do not eat or drink anything after 12 midnight prior to surgery. This includes no water, chewing gum or mints, except for bowel prep complete per MD.  2.  Take the following pills with a small sip of water on the morning of surgery 06/29/2022.  3.  Aspirin, Ibuprofen, Advil, Naproxen, Vitamin E and other Anti-inflammatory products should be stopped for 5 days before surgery or as directed by your physician. 4.  Check with your doctor regarding stopping Plavix, Coumadin, Lovenox, Fragmin or other blood thinners. 5.  Do not smoke and do not drink alcoholic beverages 24 hours prior to surgery. This includes NA Beer. 6.  You may brush your teeth and gargle the morning of surgery. DO NOT SWALLOW WATER.  7.  You MUST make arrangements for a responsible adult to take you home after your surgery. You will not be allowed to leave alone or drive yourself home. It is strongly suggested someone stay with you the first 24 hours. Your surgery will be cancelled if you do not have a ride home. 8.  A parent/legal guardian must accompany a child scheduled for surgery and plan to stay at the hospital until the child is discharged. Please do not bring other children with you. 9.  Please wear simple, loose fitting clothing to the hospital.  Aínbal Otero not bring valuables ( money, credit cards, checkbooks, etc.)  Do not wear any makeup (including no eye makeup) or nail polish on your fingers or toes. 10.  Do not wear any jewelry or piercing on the day of surgery. All body piercing jewelry must be removed. 11.  If you have dentures, they will be removed before going to the OR; we will provide you a container. If you wear contact lenses or glasses, they will be removed; please bring a case for them.   15.  Notify your Surgeon if you develop any illness between now and surgery time; cough, cold, fever, sore throat, nausea, vomiting, etc.  Please notify your surgeon if you experience dizziness, shortness of breath or blurred vision between now and the time of your surgery. To provide excellent care, visitors will be limited to two in a room at any given time. Please no children under the age of 15 in the surgical department.

## 2022-06-24 NOTE — TELEPHONE ENCOUNTER
Refill Request     CONFIRM preferrred pharmacy with the patient. If Mail Order Rx - Pend for 90 day refill.       Last Seen: Last Seen Department: 6/3/2022  Last Seen by PCP: 6/3/2022    Last Written: Omeprazole 4/12/2015   Rosuvastatin 8/31/2020     Next Appointment:   Future Appointments   Date Time Provider Tania Mitchell   7/12/2022  9:00 AM [facility]       Future appointment scheduled      Requested Prescriptions     Pending Prescriptions Disp Refills    rosuvastatin (CRESTOR) 20 MG tablet 30 tablet      Sig: TAKE 1 TABLET BY MOUTH EVERY DAY    omeprazole (PRILOSEC) 20 MG delayed release capsule 30 capsule 0     Sig: Take 1 capsule by mouth Daily

## 2022-06-24 NOTE — PROGRESS NOTES
PAT completed with patient orders placed per MD, patient states her  Octavio Quiros will be her  and caregiver day of surgery. Cristina Meléndez RN

## 2022-06-29 ENCOUNTER — ANESTHESIA (OUTPATIENT)
Dept: ENDOSCOPY | Age: 72
End: 2022-06-29
Payer: MEDICARE

## 2022-06-29 ENCOUNTER — HOSPITAL ENCOUNTER (OUTPATIENT)
Age: 72
Setting detail: OUTPATIENT SURGERY
Discharge: HOME | End: 2022-06-29
Attending: INTERNAL MEDICINE | Admitting: INTERNAL MEDICINE
Payer: MEDICARE

## 2022-06-29 VITALS
HEART RATE: 64 BPM | DIASTOLIC BLOOD PRESSURE: 65 MMHG | TEMPERATURE: 96.9 F | HEIGHT: 60 IN | SYSTOLIC BLOOD PRESSURE: 137 MMHG | BODY MASS INDEX: 25.52 KG/M2 | RESPIRATION RATE: 16 BRPM | OXYGEN SATURATION: 97 % | WEIGHT: 130 LBS

## 2022-06-29 LAB
ANION GAP SERPL CALCULATED.3IONS-SCNC: 15 MMOL/L (ref 3–16)
BUN BLDV-MCNC: 10 MG/DL (ref 7–20)
CALCIUM SERPL-MCNC: 10.1 MG/DL (ref 8.3–10.6)
CHLORIDE BLD-SCNC: 101 MMOL/L (ref 99–110)
CO2: 24 MMOL/L (ref 21–32)
CREAT SERPL-MCNC: 0.6 MG/DL (ref 0.6–1.2)
EKG ATRIAL RATE: 71 BPM
EKG DIAGNOSIS: NORMAL
EKG P AXIS: 76 DEGREES
EKG P-R INTERVAL: 154 MS
EKG Q-T INTERVAL: 418 MS
EKG QRS DURATION: 84 MS
EKG QTC CALCULATION (BAZETT): 454 MS
EKG R AXIS: 54 DEGREES
EKG T AXIS: 64 DEGREES
EKG VENTRICULAR RATE: 71 BPM
GFR AFRICAN AMERICAN: >60
GFR NON-AFRICAN AMERICAN: >60
GLUCOSE BLD-MCNC: 103 MG/DL (ref 70–99)
GLUCOSE BLD-MCNC: 95 MG/DL (ref 70–99)
HCT VFR BLD CALC: 42.4 % (ref 36–48)
HEMOGLOBIN: 14 G/DL (ref 12–16)
MCH RBC QN AUTO: 28.6 PG (ref 26–34)
MCHC RBC AUTO-ENTMCNC: 33 G/DL (ref 31–36)
MCV RBC AUTO: 86.6 FL (ref 80–100)
PDW BLD-RTO: 20.7 % (ref 12.4–15.4)
PERFORMED ON: NORMAL
PLATELET # BLD: 137 K/UL (ref 135–450)
PMV BLD AUTO: 8.6 FL (ref 5–10.5)
POTASSIUM REFLEX MAGNESIUM: 4.3 MMOL/L (ref 3.5–5.1)
RBC # BLD: 4.89 M/UL (ref 4–5.2)
SODIUM BLD-SCNC: 140 MMOL/L (ref 136–145)
WBC # BLD: 8.4 K/UL (ref 4–11)

## 2022-06-29 PROCEDURE — 3609012400 HC EGD TRANSORAL BIOPSY SINGLE/MULTIPLE: Performed by: INTERNAL MEDICINE

## 2022-06-29 PROCEDURE — 7100000011 HC PHASE II RECOVERY - ADDTL 15 MIN: Performed by: INTERNAL MEDICINE

## 2022-06-29 PROCEDURE — 36415 COLL VENOUS BLD VENIPUNCTURE: CPT

## 2022-06-29 PROCEDURE — 93010 ELECTROCARDIOGRAM REPORT: CPT | Performed by: INTERNAL MEDICINE

## 2022-06-29 PROCEDURE — 7100000010 HC PHASE II RECOVERY - FIRST 15 MIN: Performed by: INTERNAL MEDICINE

## 2022-06-29 PROCEDURE — 88305 TISSUE EXAM BY PATHOLOGIST: CPT

## 2022-06-29 PROCEDURE — 6360000002 HC RX W HCPCS

## 2022-06-29 PROCEDURE — 85027 COMPLETE CBC AUTOMATED: CPT

## 2022-06-29 PROCEDURE — 3700000000 HC ANESTHESIA ATTENDED CARE: Performed by: INTERNAL MEDICINE

## 2022-06-29 PROCEDURE — 2500000003 HC RX 250 WO HCPCS

## 2022-06-29 PROCEDURE — 2580000003 HC RX 258

## 2022-06-29 PROCEDURE — 80048 BASIC METABOLIC PNL TOTAL CA: CPT

## 2022-06-29 PROCEDURE — 93005 ELECTROCARDIOGRAM TRACING: CPT | Performed by: ANESTHESIOLOGY

## 2022-06-29 PROCEDURE — 2709999900 HC NON-CHARGEABLE SUPPLY: Performed by: INTERNAL MEDICINE

## 2022-06-29 PROCEDURE — 3609027000 HC COLONOSCOPY: Performed by: INTERNAL MEDICINE

## 2022-06-29 PROCEDURE — 3700000001 HC ADD 15 MINUTES (ANESTHESIA): Performed by: INTERNAL MEDICINE

## 2022-06-29 RX ORDER — SODIUM CHLORIDE 0.9 % (FLUSH) 0.9 %
5-40 SYRINGE (ML) INJECTION EVERY 12 HOURS SCHEDULED
Status: DISCONTINUED | OUTPATIENT
Start: 2022-06-29 | End: 2022-06-30 | Stop reason: HOSPADM

## 2022-06-29 RX ORDER — LIDOCAINE HYDROCHLORIDE 20 MG/ML
INJECTION, SOLUTION EPIDURAL; INFILTRATION; INTRACAUDAL; PERINEURAL PRN
Status: DISCONTINUED | OUTPATIENT
Start: 2022-06-29 | End: 2022-06-29 | Stop reason: SDUPTHER

## 2022-06-29 RX ORDER — FAMOTIDINE 10 MG/ML
20 INJECTION, SOLUTION INTRAVENOUS ONCE
Status: DISCONTINUED | OUTPATIENT
Start: 2022-06-29 | End: 2022-06-30 | Stop reason: HOSPADM

## 2022-06-29 RX ORDER — SODIUM CHLORIDE 9 MG/ML
INJECTION, SOLUTION INTRAVENOUS PRN
Status: DISCONTINUED | OUTPATIENT
Start: 2022-06-29 | End: 2022-06-30 | Stop reason: HOSPADM

## 2022-06-29 RX ORDER — SODIUM CHLORIDE 0.9 % (FLUSH) 0.9 %
5-40 SYRINGE (ML) INJECTION PRN
Status: DISCONTINUED | OUTPATIENT
Start: 2022-06-29 | End: 2022-06-30 | Stop reason: HOSPADM

## 2022-06-29 RX ORDER — PROPOFOL 10 MG/ML
INJECTION, EMULSION INTRAVENOUS PRN
Status: DISCONTINUED | OUTPATIENT
Start: 2022-06-29 | End: 2022-06-29 | Stop reason: SDUPTHER

## 2022-06-29 RX ORDER — SODIUM CHLORIDE, SODIUM LACTATE, POTASSIUM CHLORIDE, CALCIUM CHLORIDE 600; 310; 30; 20 MG/100ML; MG/100ML; MG/100ML; MG/100ML
INJECTION, SOLUTION INTRAVENOUS CONTINUOUS PRN
Status: DISCONTINUED | OUTPATIENT
Start: 2022-06-29 | End: 2022-06-29 | Stop reason: SDUPTHER

## 2022-06-29 RX ORDER — SODIUM CHLORIDE, SODIUM LACTATE, POTASSIUM CHLORIDE, CALCIUM CHLORIDE 600; 310; 30; 20 MG/100ML; MG/100ML; MG/100ML; MG/100ML
INJECTION, SOLUTION INTRAVENOUS CONTINUOUS
Status: DISCONTINUED | OUTPATIENT
Start: 2022-06-29 | End: 2022-06-30 | Stop reason: HOSPADM

## 2022-06-29 RX ORDER — SODIUM CHLORIDE, SODIUM LACTATE, POTASSIUM CHLORIDE, CALCIUM CHLORIDE 600; 310; 30; 20 MG/100ML; MG/100ML; MG/100ML; MG/100ML
INJECTION, SOLUTION INTRAVENOUS CONTINUOUS
Status: DISCONTINUED | OUTPATIENT
Start: 2022-06-29 | End: 2022-06-29 | Stop reason: SDUPTHER

## 2022-06-29 RX ADMIN — PROPOFOL 30 MG: 10 INJECTION, EMULSION INTRAVENOUS at 13:29

## 2022-06-29 RX ADMIN — PROPOFOL 50 MG: 10 INJECTION, EMULSION INTRAVENOUS at 13:31

## 2022-06-29 RX ADMIN — PHENYLEPHRINE HYDROCHLORIDE 100 MCG: 10 INJECTION INTRAVENOUS at 13:42

## 2022-06-29 RX ADMIN — PROPOFOL 50 MG: 10 INJECTION, EMULSION INTRAVENOUS at 13:43

## 2022-06-29 RX ADMIN — LIDOCAINE HYDROCHLORIDE 100 MG: 20 INJECTION, SOLUTION EPIDURAL; INFILTRATION; INTRACAUDAL; PERINEURAL at 13:25

## 2022-06-29 RX ADMIN — PROPOFOL 50 MG: 10 INJECTION, EMULSION INTRAVENOUS at 13:33

## 2022-06-29 RX ADMIN — SODIUM CHLORIDE, POTASSIUM CHLORIDE, SODIUM LACTATE AND CALCIUM CHLORIDE: 600; 310; 30; 20 INJECTION, SOLUTION INTRAVENOUS at 12:28

## 2022-06-29 RX ADMIN — PROPOFOL 70 MG: 10 INJECTION, EMULSION INTRAVENOUS at 13:25

## 2022-06-29 ASSESSMENT — PAIN SCALES - GENERAL: PAINLEVEL_OUTOF10: 0

## 2022-06-29 ASSESSMENT — PAIN - FUNCTIONAL ASSESSMENT: PAIN_FUNCTIONAL_ASSESSMENT: 0-10

## 2022-06-29 NOTE — ANESTHESIA PRE PROCEDURE
Department of Anesthesiology  Preprocedure Note       Name:  Sarah Cisse   Age:  66 y.o.  :  1950                                          MRN:  [id number]         Date:  2022      Surgeon: Deborah Hernandes):  Gabbie Cotto DO    Procedure: Procedure(s):  EGD W/ANES. (13:45)  COLON W/ANES. Medications prior to admission:   Prior to Admission medications    Medication Sig Start Date End Date Taking? Authorizing Provider   rosuvastatin (CRESTOR) 20 MG tablet TAKE 1 TABLET BY MOUTH EVERY DAY 22   VIKI Dunham CNP   omeprazole (PRILOSEC) 20 MG delayed release capsule Take 1 capsule by mouth Daily 22   VIKI Dunham CNP   losartan (COZAAR) 25 MG tablet TAKE 1 TABLET BY MOUTH EVERY DAY 3/28/22   VIKI Murphy CNP   sertraline (ZOLOFT) 50 MG tablet Take 100 mg by mouth 2 times daily  16   Historical Provider, MD   aspirin 81 MG tablet Take 81 mg by mouth daily    Historical Provider, MD   metFORMIN ER (GLUCOPHAGE-XR) 500 MG XR tablet Take 500 mg by mouth daily (with breakfast)  4/12/15   Historical Provider, MD       Current medications:    No current facility-administered medications for this encounter. Current Outpatient Medications   Medication Sig Dispense Refill    rosuvastatin (CRESTOR) 20 MG tablet TAKE 1 TABLET BY MOUTH EVERY DAY 30 tablet 5    omeprazole (PRILOSEC) 20 MG delayed release capsule Take 1 capsule by mouth Daily 30 capsule 5    losartan (COZAAR) 25 MG tablet TAKE 1 TABLET BY MOUTH EVERY DAY 90 tablet 1    sertraline (ZOLOFT) 50 MG tablet Take 100 mg by mouth 2 times daily   1    aspirin 81 MG tablet Take 81 mg by mouth daily      metFORMIN ER (GLUCOPHAGE-XR) 500 MG XR tablet Take 500 mg by mouth daily (with breakfast)   0       Allergies:     Allergies   Allergen Reactions    Latex Rash     Elastic waste bands/ok rubber bands/gloves react/  No SOB or resp problems --- JUST SKIN REACTION    Lisinopril Swelling     angioedema    Codeine Other (See Comments)     Chest pain    Nickel Rash       Problem List:    Patient Active Problem List   Diagnosis Code    Dusty garces M21.629    Pes anserinus bursitis of right knee M70.51    Primary osteoarthritis of right knee M17.11    Impingement syndrome of left shoulder M75.42    Primary osteoarthritis of left shoulder M19.012    Tear of left rotator cuff M75.102    Kyle's disease (erythromelalgia) ([facility]) 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.81       Past Medical History:        Diagnosis Date    Diabetes mellitus ([facility] [address]     Gastritis     GERD (gastroesophageal reflux disease)     Hepatitis C antibody positive in blood 2021    Hyperlipidemia     Neuralgia     secondary to shingles    Pes anserinus bursitis of right knee 02/15/2016    Primary osteoarthritis of right knee 02/15/2016    Psoriatic arthritis ([facility] [address]        Past Surgical History:        Procedure Laterality Date    ANKLE FRACTURE SURGERY Right     BREAST SURGERY Right     lumpectomy    BUNIONECTOMY Right     CARPAL TUNNEL RELEASE Bilateral     CHOLECYSTECTOMY      HYSTERECTOMY (CERVIX STATUS UNKNOWN)      JOINT REPLACEMENT Right     TKR    KNEE SURGERY Right     OTHER SURGICAL HISTORY      evacuation hematoma right buttocks    SHOULDER SURGERY Right     SHOULDER SURGERY Left 2018    rotator cuff repair    TUBAL LIGATION         Social History:    Social History     Tobacco Use    Smoking status: Former Smoker     Packs/day: 0.50     Years: 30.00     Pack years: 15.00     Quit date: 2019     Years since quittin.8    Smokeless tobacco: Never Used    Tobacco comment: pt quitting 1.5 pack a month    Substance Use Topics    Alcohol use: No     Alcohol/week: 0.0 standard drinks                                Counseling given: Not Answered  Comment: pt quitting 1.5 pack a month       Vital Signs (Current):   Vitals:    22 1432   Weight: 130 lb (59 kg)   Height: 5' (1.524 m) normal exam         Pulmonary:Negative Pulmonary ROS                              Cardiovascular:Negative CV ROS                      Neuro/Psych:   Negative Neuro/Psych ROS              GI/Hepatic/Renal: Neg GI/Hepatic/Renal ROS  (+) GERD:,      (-) liver disease and no renal disease       Endo/Other:    (+) DiabetesType II DM, , .                 Abdominal:             Vascular: negative vascular ROS. Other Findings:           Anesthesia Plan      MAC     ASA 2       Induction: intravenous. Anesthetic plan and risks discussed with patient. Plan discussed with CRNA.                     Jose Patel MD   6/29/2022

## 2022-06-29 NOTE — PROGRESS NOTES
Transport here to take pt to floor. Pt in stable condition at this time. Pt denies pain or other needs.  at bedside.

## 2022-06-29 NOTE — PROCEDURES
EGD/Colonoscopy Procedure Note    Patient: Snow Jones MRN: [id number]     YOB: 1950  Age: 66 y.o. Sex: female    Unit: [address] [facility] ENDOSCOPY Room/Bed: ENDO/NONE Location: [address]     Admitting Physician: Jonas Saucedo       Primary Care Physician: VIKI Severino - CNP      HISTORY:  The patient is a 66 y.o. female with history of IRON def anemia  Past Medical History:   Diagnosis Date    Diabetes mellitus ([facility] [address]     Gastritis     GERD (gastroesophageal reflux disease)     Hepatitis C antibody positive in blood 08/13/2021    Hyperlipidemia     Neuralgia     secondary to shingles    Pes anserinus bursitis of right knee 02/15/2016    Primary osteoarthritis of right knee 02/15/2016    Psoriatic arthritis ([facility] [address]         Indication:  Iron def Anemia    DATE OF OPERATION: 6/29/22    OPERATIVE SURGEON: Ford Diehl DO    ASA: 2  Sedation: MAC       Procedure Details:    Appropriate informed consent was obtained. With the patient in left lateral position, the endoscope was passed through the hypopharynx into the esophagus, stomach, and as far as the proximal 3rd portion of the duodenum. Retroflexion was performed in the stomach to view the cardia and fundus. There is noted to be some mild erosive change in the gastric body. Biopsies were obtained with a cold biopsy forceps to rule out H. pylori. Biopsies were also obtained with cold biopsy forceps in the duodenum to rule out celiac sprue. Estimated Blood Loss: minimal to none  Gastric or Duodenal ulcer present: No      Procedure Details:      Prep Quality: Good  Cecum Intubated: Yes  With the patient in left lateral decubitus position the endoscope was inserted through the anorectal area into the rectum, through the colon  to the Cecum. Scope was introduced into a normal-appearing terminal ileum.    Water was insufflated through the biopsy channel to clean out the colon and look at the underlying mucosa. The scope was gently withdrawn and the mucosa carefully examined. Retroflexion was performed in the rectum. Patient did have internal hemorrhoids. There is noted to be sigmoid diverticulosis. .  Digital rectal exam was normal.    Complications:  none  Estimated Blood Loss: minimal to none   Procedure start time was 13:25, procedure end time was 13:54  Findings:   EGD: Gastritis  Colonoscopy: Sigmoid diverticulosis  Internal hemorrhoids    Plan:   Repeat colonoscopy in 10 years . When the report is available, we will contact the patient and update the precise follow up interval.        Signed By: Madonna Madsen, DO    Please note that some or all of this record was generated using voice recognition software. If there are any questions about the content of this document, please contact the author as some errors in transcription may have occurred.

## 2022-06-29 NOTE — ANESTHESIA POSTPROCEDURE EVALUATION
Department of Anesthesiology  Postprocedure Note    Patient: Erin Ann  MRN: [id number]  YOB: 1950  Date of evaluation: 6/29/2022      Procedure Summary     Date: 06/29/22 Room / Location: [place] [address] / [facility]    Anesthesia Start: 8160 Anesthesia Stop: 2075    Procedures:       EGD BIOPSY (N/A )      COLON W/ANES. (N/A ) Diagnosis: (ANEMIA, DYSPEPSIA)    Surgeons: Kristi Tubbs DO Responsible Provider: Jay Moreland MD    Anesthesia Type: MAC ASA Status: 2          Anesthesia Type: No value filed.     Charis Phase I: Charis Score: 10    Charis Phase II:        Anesthesia Post Evaluation    Patient location during evaluation: PACU  Level of consciousness: awake  Airway patency: patent  Nausea & Vomiting: no nausea  Complications: no  Cardiovascular status: blood pressure returned to baseline  Respiratory status: acceptable  Hydration status: euvolemic

## 2022-06-29 NOTE — H&P
Medications:   Prior to Admission medications    Medication Sig Start Date End Date Taking? Authorizing Provider   rosuvastatin (CRESTOR) 20 MG tablet TAKE 1 TABLET BY MOUTH EVERY DAY 6/24/22   VIKI Chen CNP   omeprazole (PRILOSEC) 20 MG delayed release capsule Take 1 capsule by mouth Daily 6/24/22   VIKI Chen CNP   losartan (COZAAR) 25 MG tablet TAKE 1 TABLET BY MOUTH EVERY DAY 3/28/22   VIKI Dimas CNP   sertraline (ZOLOFT) 50 MG tablet Take 100 mg by mouth 2 times daily  7/12/16   Historical Provider, MD   aspirin 81 MG tablet Take 81 mg by mouth daily    Historical Provider, MD   metFORMIN ER (GLUCOPHAGE-XR) 500 MG XR tablet Take 500 mg by mouth daily (with breakfast)  4/12/15   Historical Provider, MD       Review of Systems:  Weight Loss: No  Dysphagia: No  Dyspepsia: No    Physical Exam:   Vital Signs: Ht 5' (1.524 m)   Wt 130 lb (59 kg)   BMI 25.39 kg/m²   Vital signs reviewed:Yes    HEENT:Normal  Cardiac:Normal  Chest:Normal  Abdomen:Normal  Exts: Normal  Neuro:Normal    Labs:  No visits with results within 12 Week(s) from this visit. Latest known visit with results is:   Orders Only on 03/25/2022   Component Date Value Ref Range Status    HCV QNT by NAAT IU/ML 03/23/2022 Not Detected  IU/mL Final    HCV Qnt by NAAT log IU/ml 03/23/2022 Not Detected  log IU/mL Final    Interpretation 03/23/2022 Not Detected  Not Detected Final        Imaging:  No orders to display       ASA:2    Mallampati Score: II    Sedation planned:MAC    Patient in acceptable condition for procedure: Yes    8:49 AM 6/29/2022    Leilani Rodriguez, DO      Please note that some or all of this record was generated using voice recognition software. If there are any questions about the content of this document, please contact the author as some errors in transcription may have occurred. The patient and I discussed that this is an elective procedure/surgery.  We discussed the risks of the

## 2022-06-30 ENCOUNTER — HOSPITAL ENCOUNTER (EMERGENCY)
Age: 72
Discharge: HOME | End: 2022-06-30
Payer: MEDICARE

## 2022-06-30 ENCOUNTER — APPOINTMENT (OUTPATIENT)
Dept: CT IMAGING | Age: 72
End: 2022-06-30
Payer: MEDICARE

## 2022-06-30 VITALS
DIASTOLIC BLOOD PRESSURE: 64 MMHG | WEIGHT: 135 LBS | HEART RATE: 71 BPM | SYSTOLIC BLOOD PRESSURE: 151 MMHG | OXYGEN SATURATION: 100 % | HEIGHT: 60 IN | TEMPERATURE: 99.3 F | RESPIRATION RATE: 17 BRPM | BODY MASS INDEX: 26.5 KG/M2

## 2022-06-30 DIAGNOSIS — R74.8 ELEVATED LIVER ENZYMES: ICD-10-CM

## 2022-06-30 DIAGNOSIS — K57.32 DIVERTICULITIS OF COLON: Primary | ICD-10-CM

## 2022-06-30 LAB
A/G RATIO: 1.3 (ref 1.1–2.2)
ALBUMIN SERPL-MCNC: 4.5 G/DL (ref 3.4–5)
ALP BLD-CCNC: 70 U/L (ref 40–129)
ALT SERPL-CCNC: 189 U/L (ref 10–40)
AMORPHOUS: NORMAL /HPF
ANION GAP SERPL CALCULATED.3IONS-SCNC: 14 MMOL/L (ref 3–16)
ANISOCYTOSIS: ABNORMAL
AST SERPL-CCNC: 187 U/L (ref 15–37)
BASOPHILS ABSOLUTE: 0.1 K/UL (ref 0–0.2)
BASOPHILS RELATIVE PERCENT: 0.8 %
BILIRUB SERPL-MCNC: 0.3 MG/DL (ref 0–1)
BILIRUBIN URINE: NEGATIVE
BLOOD, URINE: ABNORMAL
BUN BLDV-MCNC: 8 MG/DL (ref 7–20)
CALCIUM SERPL-MCNC: 10.4 MG/DL (ref 8.3–10.6)
CHLORIDE BLD-SCNC: 100 MMOL/L (ref 99–110)
CLARITY: CLEAR
CO2: 24 MMOL/L (ref 21–32)
COLOR: YELLOW
CREAT SERPL-MCNC: <0.5 MG/DL (ref 0.6–1.2)
EOSINOPHILS ABSOLUTE: 0.1 K/UL (ref 0–0.6)
EOSINOPHILS RELATIVE PERCENT: 1.1 %
EPITHELIAL CELLS, UA: NORMAL /HPF (ref 0–5)
GFR AFRICAN AMERICAN: >60
GFR NON-AFRICAN AMERICAN: >60
GLUCOSE BLD-MCNC: 91 MG/DL (ref 70–99)
GLUCOSE URINE: NEGATIVE MG/DL
HCT VFR BLD CALC: 43 % (ref 36–48)
HEMOGLOBIN: 14 G/DL (ref 12–16)
INFLUENZA A: NOT DETECTED
INFLUENZA B: NOT DETECTED
KETONES, URINE: NEGATIVE MG/DL
LEUKOCYTE ESTERASE, URINE: NEGATIVE
LIPASE: 16 U/L (ref 13–60)
LYMPHOCYTES ABSOLUTE: 1.5 K/UL (ref 1–5.1)
LYMPHOCYTES RELATIVE PERCENT: 16.7 %
MCH RBC QN AUTO: 28.7 PG (ref 26–34)
MCHC RBC AUTO-ENTMCNC: 32.7 G/DL (ref 31–36)
MCV RBC AUTO: 87.8 FL (ref 80–100)
MICROSCOPIC EXAMINATION: YES
MONOCYTES ABSOLUTE: 0.7 K/UL (ref 0–1.3)
MONOCYTES RELATIVE PERCENT: 7.7 %
NEUTROPHILS ABSOLUTE: 6.8 K/UL (ref 1.7–7.7)
NEUTROPHILS RELATIVE PERCENT: 73.7 %
NITRITE, URINE: NEGATIVE
PDW BLD-RTO: 20.2 % (ref 12.4–15.4)
PH UA: 6 (ref 5–8)
PLATELET # BLD: 154 K/UL (ref 135–450)
PMV BLD AUTO: 8.4 FL (ref 5–10.5)
POIKILOCYTES: ABNORMAL
POTASSIUM SERPL-SCNC: 4.7 MMOL/L (ref 3.5–5.1)
PROTEIN UA: NEGATIVE MG/DL
RBC # BLD: 4.89 M/UL (ref 4–5.2)
RBC UA: NORMAL /HPF (ref 0–4)
SARS-COV-2 RNA, RT PCR: NOT DETECTED
SCHISTOCYTES: ABNORMAL
SLIDE REVIEW: ABNORMAL
SODIUM BLD-SCNC: 138 MMOL/L (ref 136–145)
SPECIFIC GRAVITY UA: <=1.005 (ref 1–1.03)
TOTAL PROTEIN: 7.9 G/DL (ref 6.4–8.2)
URINE REFLEX TO CULTURE: ABNORMAL
URINE TYPE: ABNORMAL
UROBILINOGEN, URINE: 0.2 E.U./DL
WBC # BLD: 9.2 K/UL (ref 4–11)
WBC UA: NORMAL /HPF (ref 0–5)

## 2022-06-30 PROCEDURE — 99285 EMERGENCY DEPT VISIT HI MDM: CPT

## 2022-06-30 PROCEDURE — 6370000000 HC RX 637 (ALT 250 FOR IP): Performed by: NURSE PRACTITIONER

## 2022-06-30 PROCEDURE — 96374 THER/PROPH/DIAG INJ IV PUSH: CPT

## 2022-06-30 PROCEDURE — 85025 COMPLETE CBC W/AUTO DIFF WBC: CPT

## 2022-06-30 PROCEDURE — 6360000004 HC RX CONTRAST MEDICATION: Performed by: NURSE PRACTITIONER

## 2022-06-30 PROCEDURE — 87636 SARSCOV2 & INF A&B AMP PRB: CPT

## 2022-06-30 PROCEDURE — 83690 ASSAY OF LIPASE: CPT

## 2022-06-30 PROCEDURE — 6360000002 HC RX W HCPCS: Performed by: NURSE PRACTITIONER

## 2022-06-30 PROCEDURE — 96375 TX/PRO/DX INJ NEW DRUG ADDON: CPT

## 2022-06-30 PROCEDURE — 36415 COLL VENOUS BLD VENIPUNCTURE: CPT

## 2022-06-30 PROCEDURE — 74177 CT ABD & PELVIS W/CONTRAST: CPT

## 2022-06-30 PROCEDURE — 70450 CT HEAD/BRAIN W/O DYE: CPT

## 2022-06-30 PROCEDURE — 81001 URINALYSIS AUTO W/SCOPE: CPT

## 2022-06-30 PROCEDURE — 80053 COMPREHEN METABOLIC PANEL: CPT

## 2022-06-30 PROCEDURE — 2580000003 HC RX 258: Performed by: NURSE PRACTITIONER

## 2022-06-30 RX ORDER — MORPHINE SULFATE 4 MG/ML
4 INJECTION, SOLUTION INTRAMUSCULAR; INTRAVENOUS ONCE
Status: COMPLETED | OUTPATIENT
Start: 2022-06-30 | End: 2022-06-30

## 2022-06-30 RX ORDER — 0.9 % SODIUM CHLORIDE 0.9 %
1000 INTRAVENOUS SOLUTION INTRAVENOUS ONCE
Status: COMPLETED | OUTPATIENT
Start: 2022-06-30 | End: 2022-06-30

## 2022-06-30 RX ORDER — AMOXICILLIN AND CLAVULANATE POTASSIUM 500; 125 MG/1; MG/1
1 TABLET, FILM COATED ORAL 3 TIMES DAILY
Qty: 30 TABLET | Refills: 0 | Status: SHIPPED | OUTPATIENT
Start: 2022-06-30 | End: 2022-07-10

## 2022-06-30 RX ORDER — AMOXICILLIN AND CLAVULANATE POTASSIUM 875; 125 MG/1; MG/1
1 TABLET, FILM COATED ORAL ONCE
Status: COMPLETED | OUTPATIENT
Start: 2022-06-30 | End: 2022-06-30

## 2022-06-30 RX ORDER — ONDANSETRON 2 MG/ML
4 INJECTION INTRAMUSCULAR; INTRAVENOUS ONCE
Status: COMPLETED | OUTPATIENT
Start: 2022-06-30 | End: 2022-06-30

## 2022-06-30 RX ADMIN — AMOXICILLIN AND CLAVULANATE POTASSIUM 1 TABLET: 875; 125 TABLET, FILM COATED ORAL at 20:38

## 2022-06-30 RX ADMIN — IOPAMIDOL 75 ML: 755 INJECTION, SOLUTION INTRAVENOUS at 19:23

## 2022-06-30 RX ADMIN — MORPHINE SULFATE 4 MG: 4 INJECTION INTRAVENOUS at 17:42

## 2022-06-30 RX ADMIN — ONDANSETRON HYDROCHLORIDE 4 MG: 2 INJECTION, SOLUTION INTRAMUSCULAR; INTRAVENOUS at 17:42

## 2022-06-30 RX ADMIN — SODIUM CHLORIDE 1000 ML: 9 INJECTION, SOLUTION INTRAVENOUS at 17:42

## 2022-06-30 ASSESSMENT — ENCOUNTER SYMPTOMS
SORE THROAT: 0
VOMITING: 0
BLOOD IN STOOL: 0
ABDOMINAL PAIN: 1
SHORTNESS OF BREATH: 0
COUGH: 0
EYE PAIN: 0
DIARRHEA: 0
BACK PAIN: 0
NAUSEA: 1
RHINORRHEA: 0

## 2022-06-30 ASSESSMENT — PAIN DESCRIPTION - DESCRIPTORS: DESCRIPTORS: ACHING;SHARP

## 2022-06-30 ASSESSMENT — PAIN DESCRIPTION - LOCATION: LOCATION: ABDOMEN

## 2022-06-30 ASSESSMENT — PAIN DESCRIPTION - ORIENTATION: ORIENTATION: LOWER

## 2022-06-30 ASSESSMENT — PAIN - FUNCTIONAL ASSESSMENT: PAIN_FUNCTIONAL_ASSESSMENT: 0-10

## 2022-06-30 ASSESSMENT — PAIN SCALES - GENERAL: PAINLEVEL_OUTOF10: 6

## 2022-06-30 NOTE — ED PROVIDER NOTES
Cardiovascular: Negative for chest pain and leg swelling. Gastrointestinal: Positive for abdominal pain and nausea. Negative for blood in stool, diarrhea and vomiting. Genitourinary: Negative for difficulty urinating, dysuria, flank pain and frequency. Musculoskeletal: Negative for back pain and neck pain. Skin: Negative for rash and wound. Neurological: Positive for headaches. Negative for dizziness and light-headedness. PAST MEDICAL HISTORY     Past Medical History:   Diagnosis Date    Diabetes mellitus ([facility] [address]     Gastritis     GERD (gastroesophageal reflux disease)     Hepatitis C antibody positive in blood 08/13/2021    Hyperlipidemia     Neuralgia     secondary to shingles    Pes anserinus bursitis of right knee 02/15/2016    Primary osteoarthritis of right knee 02/15/2016    Psoriatic arthritis ([facility] [address]        SURGICAL HISTORY     Past Surgical History:   Procedure Laterality Date    ANKLE FRACTURE SURGERY Right     BREAST SURGERY Right     lumpectomy    BUNIONECTOMY Right     CARPAL TUNNEL RELEASE Bilateral     CHOLECYSTECTOMY      COLONOSCOPY  06/29/2022    with anes    COLONOSCOPY N/A 6/29/2022    COLON W/ANES.  performed by Mariano Stevens DO at [address] [facility] ESOPHAGOGASTRODUODENOSCOPY  06/29/2022    EGD WITH BIOPSY    HYSTERECTOMY (CERVIX STATUS UNKNOWN)      JOINT REPLACEMENT Right 2004    TKR    KNEE SURGERY Right     OTHER SURGICAL HISTORY      evacuation hematoma right buttocks    SHOULDER SURGERY Right     SHOULDER SURGERY Left 04/25/2018    rotator cuff repair    TUBAL LIGATION      UPPER GASTROINTESTINAL ENDOSCOPY N/A 6/29/2022    EGD BIOPSY performed by Mariano Stevens DO at [address]       Discharge Medication List as of 6/30/2022  8:41 PM      CONTINUE these medications which have NOT CHANGED    Details   rosuvastatin (CRESTOR) 20 MG tablet TAKE 1 TABLET BY MOUTH EVERY DAY, Disp-30 tablet, R-5Normal No    Lack of Transportation (Non-Medical): No   Physical Activity: Insufficiently Active    Days of Exercise per Week: 5 days    Minutes of Exercise per Session: 20 min   Stress:     Feeling of Stress : Not on file   Social Connections:     Frequency of Communication with Friends and Family: Not on file    Frequency of Social Gatherings with Friends and Family: Not on file    Attends Gnosticist Services: Not on file    Active Member of Clubs or Organizations: Not on file    Attends Club or Organization Meetings: Not on file    Marital Status: Not on file   Intimate Partner Violence: Not At Risk    Fear of Current or Ex-Partner: No    Emotionally Abused: No    Physically Abused: No    Sexually Abused: No   Housing Stability: Low Risk     Unable to Pay for Housing in the Last Year: No    Number of [place] in the Last Year: 1    Unstable Housing in the Last Year: No       SCREENINGS    [place] Coma Scale  Eye Opening: Spontaneous  Best Verbal Response: Oriented  Best Motor Response: Obeys commands  Jerel Coma Scale Score: 15        PHYSICAL EXAM    (up to 7 for level 4, 8 or more for level 5)     ED Triage Vitals [06/30/22 1711]   BP Temp Temp Source Heart Rate Resp SpO2 Height Weight   135/74 99.3 °F (37.4 °C) Oral 82 14 99 % 5' (1.524 m) 135 lb (61.2 kg)       Physical Exam  Vitals and nursing note reviewed. Constitutional:       Appearance: Normal appearance. She is not toxic-appearing or diaphoretic. HENT:      Head: Normocephalic and atraumatic. Nose: Nose normal.   Eyes:      General:         Right eye: No discharge. Left eye: No discharge. Cardiovascular:      Rate and Rhythm: Normal rate and regular rhythm. Heart sounds: Normal heart sounds. No murmur heard. Pulmonary:      Effort: Pulmonary effort is normal. No respiratory distress. Abdominal:      General: Abdomen is flat. Bowel sounds are normal. There is no distension. Palpations: Abdomen is soft. Tenderness: There is abdominal tenderness in the suprapubic area. There is no guarding or rebound. Negative signs include Chavarria's sign and McBurney's sign. Musculoskeletal:         General: Normal range of motion. Cervical back: Normal range of motion and neck supple. Skin:     General: Skin is warm and dry. Capillary Refill: Capillary refill takes less than 2 seconds. Neurological:      General: No focal deficit present. Mental Status: She is alert and oriented to person, place, and time. Psychiatric:         Mood and Affect: Mood normal.         Behavior: Behavior normal.         DIAGNOSTIC RESULTS   LABS:    Labs Reviewed   CBC WITH AUTO DIFFERENTIAL - Abnormal; Notable for the following components:       Result Value    RDW 20.2 (*)     Anisocytosis 2+ (*)     Poikilocytes Occasional (*)     Schistocytes Occasional (*)     All other components within normal limits   COMPREHENSIVE METABOLIC PANEL - Abnormal; Notable for the following components:    CREATININE <0.5 (*)      (*)      (*)     All other components within normal limits   URINALYSIS WITH REFLEX TO CULTURE - Abnormal; Notable for the following components:    Blood, Urine TRACE-INTACT (*)     All other components within normal limits   COVID-19 & INFLUENZA COMBO   LIPASE   MICROSCOPIC URINALYSIS       When ordered, only abnormal lab results are displayed. All other labs were within normal range or not returned as of this dictation. EKG: When ordered, EKG's are interpreted by the Emergency Department Physician in the absence of a cardiologist.  Please see their note for interpretation of EKG.       RADIOLOGY:   Non-plain film images such as CT, Ultrasound and MRI are read by the radiologist. Plain radiographic images are visualized andpreliminarily interpreted by the  ED Provider with the below findings:        Interpretation [facility] Radiologist below, if available at the time of this note:    [address] IV CONTRAST Additional Contrast? None   Final Result   There is findings to suggest an acute mild sigmoid diverticulitis with   pericolonic stranding and wall thickening. No perforation or abscess at this   time. Mild intrahepatic and extrahepatic biliary ductal dilatation which can be   seen in a post cholecystectomy patient. Remainder of the liver and pancreas   are unremarkable. Mild prominence of the pancreatic duct. RECOMMENDATIONS:   Unavailable         CT HEAD WO CONTRAST   Final Result   Atrophy and chronic changes seen within the brain with no acute intracranial   abnormality. RECOMMENDATIONS:   Unavailable           No results found. PROCEDURES   Unless otherwise noted below, none     Procedures    CRITICAL CARE TIME   N/A    CONSULTS:  None      EMERGENCY DEPARTMENT COURSE and DIFFERENTIAL DIAGNOSIS/MDM:   Vitals:    Vitals:    06/30/22 1744 06/30/22 1802 06/30/22 1832 06/30/22 1901   BP: 132/60 (!) 121/51 (!) 122/53 (!) 151/64   Pulse: 72 70 71 71   Resp: 16 17 18 17   Temp:       TempSrc:       SpO2: 93% 92% 92% 100%   Weight:       Height:           Patient was given thefollowing medications:  Medications   0.9 % sodium chloride bolus (0 mLs IntraVENous Stopped 6/30/22 2038)   morphine sulfate (PF) injection 4 mg (4 mg IntraVENous Given 6/30/22 1742)   ondansetron (ZOFRAN) injection 4 mg (4 mg IntraVENous Given 6/30/22 1742)   iopamidol (ISOVUE-370) 76 % injection 75 mL (75 mLs IntraVENous Given 6/30/22 1923)   amoxicillin-clavulanate (AUGMENTIN) 875-125 MG per tablet 1 tablet (1 tablet Oral Given 6/30/22 2038)         Is this patient to be included in the SEP-1 Core Measure due to severe sepsis or septic shock? No   Exclusion criteria - the patient is NOT to be included for SEP-1 Core Measure due to:  <2 SIRS     Patient is noted to have diverticulitis. The patient CT scan was read as diverticulitis of the sigmoid colon. No complicating factors.   Also her limited abdomen was elevated. She states that this is a history of hers that she has had problems in the past.  The patient otherwise displays no further acute complaints at this time and will be discharged home in good condition. Patient verbalized understanding and agrees with treatment plan and discharge. She is provided referral to both Dr. Rock Hanks who performed her scope to follow-up with both the postoperative pain and the liver enzymes that are elevated. And that her family doctor to be evaluated for her other chronic conditions as well as the pain and liver enzymes. She verbalized understanding and agrees with the treatment plan and discharge. She is to follow-up with family doctor in 2 to 3 days. She is to call Dr. Dyllan Rodriguez for further management. Also advised return back to the ER for any further acute complaints. FINAL IMPRESSION      1. Diverticulitis of colon    2.  Elevated liver enzymes          DISPOSITION/PLAN   DISPOSITION Decision To Discharge 06/30/2022 08:06:54 PM      PATIENT REFERREDTO:  VIKI Hanson CNP  [address] [place], [place] [address] [facility] [address]  [phone]    Schedule an appointment as soon as possible for a visit       [facility]   [address] [facility]  [phone]    Schedule an appointment as soon as possible for a visit         DISCHARGE MEDICATIONS:  Discharge Medication List as of 6/30/2022  8:41 PM      START taking these medications    Details   amoxicillin-clavulanate (AUGMENTIN) 500-125 MG per tablet Take 1 tablet by mouth 3 times daily for 10 days, Disp-30 tablet, R-0Print             DISCONTINUED MEDICATIONS:  Discharge Medication List as of 6/30/2022  8:41 PM                 (Please note that portions ofthis note were completed with a voice recognition program.  Efforts were made to edit the dictations but occasionally words are mis-transcribed.)    [employer] [facility], APRN - CNP (electronically signed)            [employer] [facility], APRN - CNP  07/02/22 0127

## 2022-07-12 ENCOUNTER — OFFICE VISIT (OUTPATIENT)
Dept: RHEUMATOLOGY | Age: 72
End: 2022-07-12
Payer: MEDICARE

## 2022-07-12 VITALS
WEIGHT: 135 LBS | HEIGHT: 60 IN | SYSTOLIC BLOOD PRESSURE: 120 MMHG | BODY MASS INDEX: 26.5 KG/M2 | DIASTOLIC BLOOD PRESSURE: 76 MMHG

## 2022-07-12 DIAGNOSIS — R79.89 ELEVATED LFTS: ICD-10-CM

## 2022-07-12 DIAGNOSIS — M15.9 GENERALIZED OSTEOARTHRITIS: Primary | ICD-10-CM

## 2022-07-12 DIAGNOSIS — Z13.89 SCREENING FOR RHEUMATIC DISORDER: ICD-10-CM

## 2022-07-12 PROCEDURE — G8400 PT W/DXA NO RESULTS DOC: HCPCS | Performed by: INTERNAL MEDICINE

## 2022-07-12 PROCEDURE — 1090F PRES/ABSN URINE INCON ASSESS: CPT | Performed by: INTERNAL MEDICINE

## 2022-07-12 PROCEDURE — 1036F TOBACCO NON-USER: CPT | Performed by: INTERNAL MEDICINE

## 2022-07-12 PROCEDURE — 99205 OFFICE O/P NEW HI 60 MIN: CPT | Performed by: INTERNAL MEDICINE

## 2022-07-12 PROCEDURE — 1123F ACP DISCUSS/DSCN MKR DOCD: CPT | Performed by: INTERNAL MEDICINE

## 2022-07-12 PROCEDURE — 3017F COLORECTAL CA SCREEN DOC REV: CPT | Performed by: INTERNAL MEDICINE

## 2022-07-12 PROCEDURE — G8417 CALC BMI ABV UP PARAM F/U: HCPCS | Performed by: INTERNAL MEDICINE

## 2022-07-12 PROCEDURE — G8427 DOCREV CUR MEDS BY ELIG CLIN: HCPCS | Performed by: INTERNAL MEDICINE

## 2022-07-12 NOTE — PROGRESS NOTES
[address], [place]                                                           [address] PRAKASH Daniels [address], [address] [facility]                                                             [phone] (P) [phone] (F)      Note is transcribed using voice recognition software. Inadvertent computerized transcription errors may be present. Patient identification: [email or web address] Contreras : 1950,71 y.o. REASON FOR CONSULTATION:  Patient is being seen at the request of  VIKI Solitario - CARLOS / VIKI Valenzuela -*is here for evaluation of joint pain. HISTORY OF PRESENT ILLNESS:  66 y.o. female with a history of erythromelalgia, Ménière's disease, hyperacusis, right knee replacement-states that she has been experiencing discomfort in her hands-PIPs, DIPs, CMC's, intermittent discomfort in her ankles and feet joints. She also gets neck stiffness and occipital headaches. She looked up on the Internet, thought that she might have psoriatic arthritis therefore made this appointment. Patient denies any swollen joints other than bony swelling and some of the PIPs and DIPs. She does not have history of psoriasis. Her arthralgias are intermittent, worse with repetitive use. She also reports extreme fatigue, suffers from depression as well. She denies any rashes, photosensitivity, mucositis, focal alopecia, chest pain, Raynaud's phenomenon, or focal muscle weakness. ADLs and recreational activities are limited because of the fatigue. She has history of erythromelalgia that affects her hands and feet intermittently, with causes burning and erythema of her hands and feet.     Patient is followed by GI for microcytic hypochromic anemia, recently had EGD and colonoscopy and received iron infusion. She has elevated liver function test, hep C positive but viral load negative. All other review of systems are negative. PMH, PSH,Social history , Meds reviewed. FH-family history of erythromelalgia. No history of autoimmune rheumatic disorders. PHYSICAL EXAM:    Vitals:    /76   Ht 5' (1.524 m)   Wt 135 lb (61.2 kg)   BMI 26.37 kg/m²   AA)x3 well nourished, and well groomed, normal judgement. MKS: Other than subtle OA changes across her CMC's, DIPs, PIPs, left knee and mid feet joints-no appreciable tender, swollen, inflamed joints in upper or lower extremities. No objective findings of inflammatory arthritis. Normal gait, muscle strength in upper and lower extremities. Skin: No rashes, no induration or skin thickening or nodules. HEENT: Normal lids, lacrimal glands and pupils. No oral or nasal ulcers. Salivary glands reveal no evidence of abnormality. External inspection of the ears and nose within normal limits. Neck: Supple no adenopathy. Chest: Normal effort      DATA:   Lab Results   Component Value Date    WBC 9.2 06/30/2022    HGB 14.0 06/30/2022    HCT 43.0 06/30/2022    MCV 87.8 06/30/2022     06/30/2022     Lab Results   Component Value Date     06/30/2022    K 4.7 06/30/2022     06/30/2022    CO2 24 06/30/2022    BUN 8 06/30/2022    CREATININE <0.5 (L) 06/30/2022    GLUCOSE 91 06/30/2022    CALCIUM 10.4 06/30/2022    PROT 7.9 06/30/2022    LABALBU 4.5 06/30/2022    BILITOT 0.3 06/30/2022    ALKPHOS 70 06/30/2022     (H) 06/30/2022     (H) 06/30/2022    LABGLOM >60 06/30/2022    GFRAA >60 06/30/2022    AGRATIO 1.3 06/30/2022    GLOB 3.2 08/13/2021       Lab Results   Component Value Date    SEDRATE 22 10/12/2020         ASSESSMENT AND PLAN:   Diagnosis Orders   1. Generalized osteoarthritis     2. Elevated LFTs     3. Screening for rheumatic disorder       1.   Generalized osteoarthritis-  Joint pain from generalized osteoarthritis. Explained diagnosis, management options, and reassured patient that no objective findings for inflammatory arthritis or psoriatic arthritis. Joint symptoms are pretty manageable, does not take any medications for musculoskeletal discomfort. May try diclofenac gel as needed and acetaminophen only if needed. Exercises as tolerated. Good hydration, sleep, adequate steps/exercises can also help with fatigue. She is already taking Zoloft, unable to combine Cymbalta which is also improved for depression and osteoarthritic pain. Avoid NSAIDs because of GI bleed. 2. Elevated liver function test, recent GI bleed, patient is followed by gastroenterology. 3. Bone health-recommend seeing primary care physician for DEXA scan monitoring. Nothing much I can offer her at this time therefore follow-up with primary care physician. #################################################################################################  Patient indicates understanding and agrees with the management plan. Total time >60 minutes that includes the following-  Preparing to see the patient such as reviewing patients records, pre-charting, preparing the visit on the same day, performing a medically appropriate history and physical examination, counseling and educating patient about diagnosis, management plan, ordering appropriate testings, prescriptions, communicating findings to other care providers, and documenting clinical information in electronic medical record. I thank you for giving me the opportunity to be involved in [place] care and I look forward following Dulce Maria Lazo along with you. If you have any questions or concerns please feel free to contact me at any time.   Yenny White MD 07/12/22

## 2022-07-12 NOTE — PATIENT INSTRUCTIONS
Diclofenac gel  Acetaminophen 500 mg 2-3 times a day as needed         OSTEOARTHRITIS:                Osteoarthritis is a joint disease that most often affects middle-age to elderly people. It is commonly referred to as OA or as \"wear and tear\" of the joints, but we now know that OA is a disease of the entire joint, involving the cartilage, joint lining, ligaments, and bone. Although it is more common in older people, it is not really accurate to say that the joints are just \"wearing out. \"  About 27 million Americans are living with OA, the most common form of joint disease. The lifetime risk of developing OA of the knee is about 46%, and the lifetime risk of developing OA of the hip is 25%, according to the [facility], a long-term study from the [employer] and sponsored by [employer] for [employer] and [employer] (often called the [employer]) and the [employer]. OA is a top cause of disability in older people. The goal of treatment in OA is to reduce pain and improve function. There is no cure for the disease, but some treatments attempt to slow disease progression. Fast facts  OA is the most common form of joint disease, and is a leading cause of disability in elderly people. This arthritis tends to occur in the hand joints, spine, hips, knees, and great toes. It is characterized by breakdown of the cartilage (the tissue that cushions the ends of the bones between joints), bony changes of the joints, deterioration of tendons and ligaments, and various degrees of inflammation of the synovium (joint lining). Though some of the joint changes are irreversible, most patients will not need joint replacement surgery. OA symptoms (what you feel) can vary greatly among patients. A rheumatologist can detect arthritis and prescribe the proper treatment.     In osteoarthritis, the cartilage between the bones in the joint breaks down (left image). Slowly, affected bones get bigger, as in the hand at right. What is osteoarthritis? OA is a frequently slowly progressive joint disease typically seen in middle-aged to elderly people. The disease occurs when the joint cartilage breaks down often because of mechanical stress or biochemical alterations, causing the bone underneath to fail. OA can occur together with other types of arthritis, such as gout or rheumatoid arthritis. OA tends to affect commonly used joints such as the hands and spine, and the weight-bearing joints such as the hips and knees. Symptoms include:   Joint pain and stiffness   Knobby swelling at the joint   Cracking or grinding noise with joint movement   Decreased function of the joint  Who gets osteoarthritis? OA affects people of all races and both sexes. Most often, it occurs in  patients age 36 and above. However, it can occur sooner if you have  other risk factors (things that raise the risk of getting OA). Risk factors include:   Older age   Having family members with OA   Obesity   Joint injury or repetitive use (overuse) of joints   Joint deformity such as unequal leg length, bowlegs or knocked knees  How is osteoarthritis diagnosed? Most often doctors detect OA based on the typical symptoms (described earlier) and on results of the physical exam. In some cases, X-rays or other imaging tests may be useful to tell the extent of disease or to help rule out other joint problems. Circles indicate joints that osteoarthritis most often affects. How is osteoarthritis treated? There is no proven treatment yet that can reverse joint damage from OA. The goal of treatment is to reduce pain and improve function of the affected joints. Most often, this is possible with a mixture of physical measures and drug therapy and, sometimes, surgery. Physical measures - Weight loss and exercise are useful in OA. Excess weight puts stress on your knee joints and hips and low back. For every 10 pounds of weight you lose over 10 years, you can reduce the chance of developing knee OA by up to 50%. Exercise can improve your muscle strength, decrease joint pain and stiffness, and lower the chance of disability due to OA. Also helpful are support (\"assistive\") devices, such as braces or a walking cane, that help you do daily activities. Heat or cold therapy can help relieve OA symptoms for a short time. Certain alternative treatments such as spa (hot tub), massage, acupuncture and chiropractic manipulation can help relieve pain for a short time. They can be costly, though, and require repeated treatments. Also, the long-term benefits of these alternative (sometimes called complementary or integrative) medicine treatments are unproven but are under study. Drug Therapy - Forms of drug therapy include topical, oral (by mouth) and injections (shots). You apply topical drugs directly on the skin over the affected joints. These medicines include capsaicin cream, lidocaine and diclofenac gel. Oral pain relievers such as acetaminophen are common first treatments. So are nonsteroidal anti-inflammatory drugs (often called NSAIDs), which decrease swelling and pain. In 2010, the government (FDA) approved the use of duloxetine (Cymbalta) for chronic (long-term) musculoskeletal pain including from OA. This oral drug is not new. It also is in use for other health concerns, such as mood disorders, nerve pain and fibromyalgia. Patients with more serious pain may need stronger medications, such as prescription narcotics. Joint injections with corticosteroids (sometimes called cortisone shots) or with a form of lubricant called hyaluronic acid can give months of pain relief from OA. This lubricant is given in the knee, and these shots may help delay the need for a knee replacement by a few years in some patients. Surgery - Surgical treatment becomes an option for severe cases.  This includes when the joint has serious damage, or when medical treatment fails to relieve pain and you have major loss of function. Surgery may involve arthroscopy, repair of the joint done through small incisions (cuts). If the joint damage cannot be repaired, you may need a joint replacement. Supplements - Many over-the-counter nutrition supplements have been used for treatment of OA. Most lack good research data to support their effectiveness and safety. Among the most widely used are glucosamine/chondroitin sulfate, calcium and vitamin D, and omega-3 fatty acids. To ensure safety and avoid drug interactions, consult your doctor or pharmacist before using any of these supplements. This is especially true when you are combining these supplements with prescribed drugs. Living with Osteoarthritis  There is no cure for OA, but you can manage how it affects your lifestyle. Some tips include:  Properly position and support your neck and back while sitting or sleeping. Adjust furniture, such as raising a chair or toilet seat. Avoid repeated motions of the joint, especially frequent bending. Lose weight if you are overweight or obese, which can reduce pain and slow progression of OA. Exercise each day. Use arthritis support devices that will help you do daily activities. You might want to work with a physical therapist or occupational therapist to learn the best exercises and to choose arthritis assistive devices. Points to remember  OA is the most common form of arthritis and can occur together with other types of arthritis. The goal of treatment in OA is to reduce pain and improve function. Exercise is an important part of OA treatment because it can decrease joint pain and improve function. At present, there is no treatment that can reverse the damage of OA in the joints. Researchers are trying to find ways to slow or reverse this joint damage.    The rheumatologist's role in the treatment of osteoarthritis  Rheumatologists are doctors who are experts in diagnosing and treating arthritis and other diseases of the joints, muscles and bones. You may also need to see other health care providers, for instance, physical or occupational therapists and orthopedic doctors. To find a rheumatologist  For a list of rheumatologists in your area, click here. Learn more about rheumatologists and rheumatology health professionals. For more information  The [employer] of Rheumatology has compiled this list to give you a starting point for your own additional research. The ACR does not endorse or maintain these Web sites, and is not responsible for any information or claims provided on them. It is always best to talk with your rheumatologist for more information and before making any decisions about your care. Arthritis Foundation  ww.arthritis. [facility] of Arthritis and Musculoskeletal and Skin Diseases   www.[facility]. nih.gov  Rheumatology [address] [place] [address] advances research and training to improve the health of people with rheumatic diseases.      © 2012 American College of [employer]

## 2022-07-15 ENCOUNTER — OFFICE VISIT (OUTPATIENT)
Dept: FAMILY MEDICINE CLINIC | Age: 72
End: 2022-07-15
Payer: MEDICARE

## 2022-07-15 VITALS
SYSTOLIC BLOOD PRESSURE: 114 MMHG | OXYGEN SATURATION: 98 % | BODY MASS INDEX: 26.48 KG/M2 | DIASTOLIC BLOOD PRESSURE: 64 MMHG | WEIGHT: 135.6 LBS | HEART RATE: 72 BPM

## 2022-07-15 DIAGNOSIS — L98.9 SKIN LESION: ICD-10-CM

## 2022-07-15 DIAGNOSIS — K57.92 DIVERTICULITIS: ICD-10-CM

## 2022-07-15 DIAGNOSIS — Z09 FOLLOW UP: Primary | ICD-10-CM

## 2022-07-15 DIAGNOSIS — R22.32 PALMAR NODULE, LEFT: ICD-10-CM

## 2022-07-15 PROCEDURE — G8417 CALC BMI ABV UP PARAM F/U: HCPCS | Performed by: NURSE PRACTITIONER

## 2022-07-15 PROCEDURE — 1123F ACP DISCUSS/DSCN MKR DOCD: CPT | Performed by: NURSE PRACTITIONER

## 2022-07-15 PROCEDURE — G8427 DOCREV CUR MEDS BY ELIG CLIN: HCPCS | Performed by: NURSE PRACTITIONER

## 2022-07-15 PROCEDURE — 1090F PRES/ABSN URINE INCON ASSESS: CPT | Performed by: NURSE PRACTITIONER

## 2022-07-15 PROCEDURE — G8400 PT W/DXA NO RESULTS DOC: HCPCS | Performed by: NURSE PRACTITIONER

## 2022-07-15 PROCEDURE — 3017F COLORECTAL CA SCREEN DOC REV: CPT | Performed by: NURSE PRACTITIONER

## 2022-07-15 PROCEDURE — 1036F TOBACCO NON-USER: CPT | Performed by: NURSE PRACTITIONER

## 2022-07-15 PROCEDURE — 99214 OFFICE O/P EST MOD 30 MIN: CPT | Performed by: NURSE PRACTITIONER

## 2022-07-15 ASSESSMENT — PATIENT HEALTH QUESTIONNAIRE - PHQ9
8. MOVING OR SPEAKING SO SLOWLY THAT OTHER PEOPLE COULD HAVE NOTICED. OR THE OPPOSITE, BEING SO FIGETY OR RESTLESS THAT YOU HAVE BEEN MOVING AROUND A LOT MORE THAN USUAL: 0
9. THOUGHTS THAT YOU WOULD BE BETTER OFF DEAD, OR OF HURTING YOURSELF: 0
SUM OF ALL RESPONSES TO PHQ QUESTIONS 1-9: 8
4. FEELING TIRED OR HAVING LITTLE ENERGY: 3
2. FEELING DOWN, DEPRESSED OR HOPELESS: 1
SUM OF ALL RESPONSES TO PHQ9 QUESTIONS 1 & 2: 3
7. TROUBLE CONCENTRATING ON THINGS, SUCH AS READING THE NEWSPAPER OR WATCHING TELEVISION: 0
SUM OF ALL RESPONSES TO PHQ QUESTIONS 1-9: 8
3. TROUBLE FALLING OR STAYING ASLEEP: 0
SUM OF ALL RESPONSES TO PHQ QUESTIONS 1-9: 8
6. FEELING BAD ABOUT YOURSELF - OR THAT YOU ARE A FAILURE OR HAVE LET YOURSELF OR YOUR FAMILY DOWN: 1
SUM OF ALL RESPONSES TO PHQ QUESTIONS 1-9: 8
1. LITTLE INTEREST OR PLEASURE IN DOING THINGS: 2
5. POOR APPETITE OR OVEREATING: 1
10. IF YOU CHECKED OFF ANY PROBLEMS, HOW DIFFICULT HAVE THESE PROBLEMS MADE IT FOR YOU TO DO YOUR WORK, TAKE CARE OF THINGS AT HOME, OR GET ALONG WITH OTHER PEOPLE: 0

## 2022-07-15 ASSESSMENT — ANXIETY QUESTIONNAIRES
2. NOT BEING ABLE TO STOP OR CONTROL WORRYING: 1
IF YOU CHECKED OFF ANY PROBLEMS ON THIS QUESTIONNAIRE, HOW DIFFICULT HAVE THESE PROBLEMS MADE IT FOR YOU TO DO YOUR WORK, TAKE CARE OF THINGS AT HOME, OR GET ALONG WITH OTHER PEOPLE: SOMEWHAT DIFFICULT
4. TROUBLE RELAXING: 1
3. WORRYING TOO MUCH ABOUT DIFFERENT THINGS: 0
1. FEELING NERVOUS, ANXIOUS, OR ON EDGE: 1
7. FEELING AFRAID AS IF SOMETHING AWFUL MIGHT HAPPEN: 1
6. BECOMING EASILY ANNOYED OR IRRITABLE: 1
GAD7 TOTAL SCORE: 5
5. BEING SO RESTLESS THAT IT IS HARD TO SIT STILL: 0

## 2022-07-15 ASSESSMENT — ENCOUNTER SYMPTOMS
GASTROINTESTINAL NEGATIVE: 1
ROS SKIN COMMENTS: SEE HPI
RESPIRATORY NEGATIVE: 1

## 2022-07-15 NOTE — PROGRESS NOTES
7/15/2022     Dixie Lewis (:  1950) is a 66 y.o. female, here for evaluation of the following medical concerns:    HPI  Pt is here for ER f/u. She has a h/o diverticulosis. Had a colonoscopy 2022. Went to the ER on 2022 for diverticulitis. She was prescribed Augmentin TID for 10 days which she took as prescribed. She states that her symptoms have resolved. She has a f/u appt with Dr. Don Cheung (GI) on 8/3/22. Pt denies abdominal pain, N/V/D, constipation, fever, chills, bloody stools or dark tarry stools. She would also like a referral to a dermatologist for a lesion on her left lower leg that has been present for several years. She also requests a referral to a hand specialist for a nodule to the palmar aspect of left hand which she has had for several years. No known injury/cause. Review of Systems   Constitutional: Negative. Respiratory: Negative. Cardiovascular: Negative. Gastrointestinal: Negative. Musculoskeletal:         See HPI   Skin:         See HPI     Prior to Visit Medications    Medication Sig Taking?  Authorizing Provider   rosuvastatin (CRESTOR) 20 MG tablet TAKE 1 TABLET BY MOUTH EVERY DAY Yes VIKI Kevin CNP   omeprazole (PRILOSEC) 20 MG delayed release capsule Take 1 capsule by mouth Daily Yes VIKI Kevin CNP   losartan (COZAAR) 25 MG tablet TAKE 1 TABLET BY MOUTH EVERY DAY Yes VIKI Pineda CNP   sertraline (ZOLOFT) 50 MG tablet Take 100 mg by mouth 2 times daily  Yes Historical Provider, MD   aspirin 81 MG tablet Take 81 mg by mouth daily Yes Historical Provider, MD   metFORMIN ER (GLUCOPHAGE-XR) 500 MG XR tablet Take 500 mg by mouth daily (with breakfast)   Patient not taking: Reported on 7/15/2022  Historical Provider, MD        Social History     Tobacco Use    Smoking status: Former     Packs/day: 0.50     Years: 30.00     Pack years: 15.00     Types: Cigarettes     Quit date: 2019     Years since quittin.8    Smokeless tobacco: Never   Substance Use Topics    Alcohol use: No     Alcohol/week: 0.0 standard drinks        Vitals:    07/15/22 1144   BP: 114/64   Site: Left Upper Arm   Position: Sitting   Cuff Size: Medium Adult   Pulse: 72   SpO2: 98%   Weight: 135 lb 9.6 oz (61.5 kg)     Estimated body mass index is 26.48 kg/m² as calculated from the following:    Height as of 22: 5' (1.524 m). Weight as of this encounter: 135 lb 9.6 oz (61.5 kg). Physical Exam  Vitals reviewed. Constitutional:       Appearance: Normal appearance. Cardiovascular:      Rate and Rhythm: Normal rate and regular rhythm. Heart sounds: Normal heart sounds. Pulmonary:      Effort: Pulmonary effort is normal.      Breath sounds: Normal breath sounds. Abdominal:      General: Abdomen is flat. Bowel sounds are normal. There is no distension. Palpations: Abdomen is soft. Tenderness: There is no abdominal tenderness. Musculoskeletal:      Right hand: Normal.      Left hand: Normal range of motion. Comments: Nodule to palmar aspect of left hand. Skin:     General: Skin is warm and dry. Findings: Lesion present. Neurological:      Mental Status: She is alert and oriented to person, place, and time. ASSESSMENT/PLAN:  1. Follow up  See HPI. 2. Diverticulitis  Pt completed antibiotics and symptoms have resolved. Normal PE. Advised pt to continue f/u with GI as scheduled on 8/3/22. 3. Skin lesion    - [facility] - Anita Loera MD, Dermatology, [facility]    4. Palmar nodule, left    - Moriah Betts MD, Hand Surgery (Hand, Wrist, Upper Extremity), [facility]    Return if symptoms worsen or fail to improve. An electronic signature was used to authenticate this note.     --VIKI Rudd - CNP on 7/15/2022 at 12:21 PM

## 2022-07-27 ENCOUNTER — OFFICE VISIT (OUTPATIENT)
Dept: ORTHOPEDIC SURGERY | Age: 72
End: 2022-07-27
Payer: MEDICARE

## 2022-07-27 VITALS — HEIGHT: 60 IN | BODY MASS INDEX: 26.5 KG/M2 | WEIGHT: 135 LBS

## 2022-07-27 DIAGNOSIS — R52 PAIN: Primary | ICD-10-CM

## 2022-07-27 PROCEDURE — 1036F TOBACCO NON-USER: CPT | Performed by: PHYSICIAN ASSISTANT

## 2022-07-27 PROCEDURE — 3017F COLORECTAL CA SCREEN DOC REV: CPT | Performed by: PHYSICIAN ASSISTANT

## 2022-07-27 PROCEDURE — 1090F PRES/ABSN URINE INCON ASSESS: CPT | Performed by: PHYSICIAN ASSISTANT

## 2022-07-27 PROCEDURE — G8427 DOCREV CUR MEDS BY ELIG CLIN: HCPCS | Performed by: PHYSICIAN ASSISTANT

## 2022-07-27 PROCEDURE — G8417 CALC BMI ABV UP PARAM F/U: HCPCS | Performed by: PHYSICIAN ASSISTANT

## 2022-07-27 PROCEDURE — 1123F ACP DISCUSS/DSCN MKR DOCD: CPT | Performed by: PHYSICIAN ASSISTANT

## 2022-07-27 PROCEDURE — G8400 PT W/DXA NO RESULTS DOC: HCPCS | Performed by: PHYSICIAN ASSISTANT

## 2022-07-27 PROCEDURE — 99203 OFFICE O/P NEW LOW 30 MIN: CPT | Performed by: PHYSICIAN ASSISTANT

## 2022-07-27 NOTE — PROGRESS NOTES
Chief Complaint    Hand Pain (left)      History of Present Illness:  Virginia Lo is a 66 y.o. female presents today for evaluation of left hand pain. Patient states that she has been experiencing pain over the palmar surface of her left hand for almost a year. She denies any precipitating event or trauma. Patient is left-hand dominant. She states that she has a nodule that has developed over the midportion of the middle finger over the palmar aspect. She finds it very difficult to fully extend her middle finger and somewhat to a lesser extent her ring finger. Does have a history of osteoarthritis within the left hand especially within the left [facility] joint and over the ulnar aspect of the left hand. She has had no previous treatments to the left hand. Pain Assessment  Location of Pain: Hand  Location Modifiers: Left  Severity of Pain: 6  Quality of Pain: Other (Comment)    Medical History:  Patient's medications, allergies, past medical, surgical, social and family histories were reviewed and updated as appropriate. Review of Systems:  Pertinent items are noted in HPI  Review of systems reviewed from Patient History Form dated on 7/27/2022 and available in the patient's chart under the Media tab. Vital Signs:  Ht 5' (1.524 m)   Wt 135 lb (61.2 kg)   BMI 26.37 kg/m²     General Exam:   Constitutional: Patient is adequately groomed with no evidence of malnutrition  Mental Status: The patient is oriented to time, place and person. The patient's mood and affect are appropriate. Neurological: The patient has good coordination. There is no weakness or sensory deficit. Left hand examination:    Inspection: Today's inspection of the left hand reveals the skin to be intact with pea-sized mass over the flexor tendon of the left middle finger with a Dupuytren's contracture.     Palpation: There is palpable pain noted over the mass and there is pain with palpation along the flexor tendon of the middle finger on the left. Range of Motion: Patient is unable to fully extend her left middle finger due to pain but she has full range of motion of the other fingers. She does have increased pain with range of motion of her left thumb. Strength: There are no deficits noted upon testing within the left hand or wrist    Special Tests: Capillary refills within 2 seconds    Skin: There are no rashes, ulcerations or lesions. Distal neurovascular status is grossly intact    Radiology:     X-rays obtained and reviewed in office:  Views 3 views of the left hand include AP, lateral, oblique were obtained today in office and independently reviewed with the patient  Location left hand  Impression there is bone-on-bone articulation noted within the [facility] joint of the left thumb and there is evidence of bony fragmentation within the soft tissue and over the ulnar aspect of the left wrist.          Assessment : Dupuytren's contracture left middle finger    Impression:  Encounter Diagnosis   Name Primary? Pain Yes       Office Procedures:  Orders Placed This Encounter   Procedures    XR HAND LEFT (MIN 3 VIEWS)     Standing Status:   Future     Number of Occurrences:   1     Standing Expiration Date:   7/27/2023       Treatment Plan: Today we discussed the diagnosis and treatment options and the patient was referred to the hand team for their continue evaluation and care.     Arjun Chapman PA-C  Board certified by the [address] [facility]

## 2022-08-04 ENCOUNTER — OFFICE VISIT (OUTPATIENT)
Dept: FAMILY MEDICINE CLINIC | Age: 72
End: 2022-08-04
Payer: MEDICARE

## 2022-08-04 VITALS
HEART RATE: 76 BPM | OXYGEN SATURATION: 93 % | DIASTOLIC BLOOD PRESSURE: 70 MMHG | WEIGHT: 135.8 LBS | SYSTOLIC BLOOD PRESSURE: 122 MMHG | BODY MASS INDEX: 26.52 KG/M2

## 2022-08-04 DIAGNOSIS — M72.0 DUPUYTREN'S CONTRACTURE: ICD-10-CM

## 2022-08-04 DIAGNOSIS — Z01.818 PRE-OPERATIVE EXAMINATION: Primary | ICD-10-CM

## 2022-08-04 DIAGNOSIS — R74.8 ELEVATED LIVER ENZYMES: ICD-10-CM

## 2022-08-04 DIAGNOSIS — D64.9 ANEMIA, UNSPECIFIED TYPE: ICD-10-CM

## 2022-08-04 DIAGNOSIS — R73.03 PREDIABETES: ICD-10-CM

## 2022-08-04 PROCEDURE — 1090F PRES/ABSN URINE INCON ASSESS: CPT | Performed by: NURSE PRACTITIONER

## 2022-08-04 PROCEDURE — G8417 CALC BMI ABV UP PARAM F/U: HCPCS | Performed by: NURSE PRACTITIONER

## 2022-08-04 PROCEDURE — 1123F ACP DISCUSS/DSCN MKR DOCD: CPT | Performed by: NURSE PRACTITIONER

## 2022-08-04 PROCEDURE — G8427 DOCREV CUR MEDS BY ELIG CLIN: HCPCS | Performed by: NURSE PRACTITIONER

## 2022-08-04 PROCEDURE — 99215 OFFICE O/P EST HI 40 MIN: CPT | Performed by: NURSE PRACTITIONER

## 2022-08-04 PROCEDURE — 3017F COLORECTAL CA SCREEN DOC REV: CPT | Performed by: NURSE PRACTITIONER

## 2022-08-04 PROCEDURE — G8400 PT W/DXA NO RESULTS DOC: HCPCS | Performed by: NURSE PRACTITIONER

## 2022-08-04 PROCEDURE — 1036F TOBACCO NON-USER: CPT | Performed by: NURSE PRACTITIONER

## 2022-08-04 NOTE — PROGRESS NOTES
Preoperative Evaluation    Subjective:   Radha Keita is a 66 y.o. y.o.  female who presents to the office today for a preoperative consultation. Surgeon: Dr. Fiorella Hatfield    Location: [address] [place] [address] in [facility]  Performing:  Removal of Dupuytren's contracture - left hand. Date: August 18. Planned anesthesia is:  twilight. The patient has the following known anesthesia issues: none   Patient has a bleeding risk of : no recent abnormal bleeding   Patient does not have objection to receiving blood products if needed. Denies any steroid use in the past 6 mo    Review of Systems   Pertinent items are noted in HPI. Denies fevers, chills, diaphoresis, CP, SOB, dysphagia, abd pain, urinary complaints, new edema, rashes, cyanosis    Past Medical History:   Diagnosis Date    Diabetes mellitus ([facility] [address]     Gastritis     GERD (gastroesophageal reflux disease)     Hepatitis C antibody positive in blood 08/13/2021    Hyperlipidemia     Neuralgia     secondary to shingles    Pes anserinus bursitis of right knee 02/15/2016    Primary osteoarthritis of right knee 02/15/2016    Psoriatic arthritis [facility])        Past Surgical History:   Procedure Laterality Date    ANKLE FRACTURE SURGERY Right     BREAST SURGERY Right     lumpectomy    BUNIONECTOMY Right     CARPAL TUNNEL RELEASE Bilateral     CHOLECYSTECTOMY      COLONOSCOPY  06/29/2022    with anes    COLONOSCOPY N/A 6/29/2022    COLON W/ANES.  performed by Tierra Odom DO at [address]    ESOPHAGOGASTRODUODENOSCOPY  06/29/2022    EGD WITH BIOPSY    HYSTERECTOMY (CERVIX STATUS UNKNOWN)      JOINT REPLACEMENT Right 2004    TKR    KNEE SURGERY Right     OTHER SURGICAL HISTORY      evacuation hematoma right buttocks    SHOULDER SURGERY Right     SHOULDER SURGERY Left 04/25/2018    rotator cuff repair    TUBAL LIGATION      UPPER GASTROINTESTINAL ENDOSCOPY N/A 6/29/2022    EGD BIOPSY performed by Jenifer Werner DO Shanita at [facility]. [address] History       Tobacco History       Smoking Status  Former Quit Date  09/2019 Smoking Frequency  0.50 packs/day for 30.00 years (15.00 pk-yrs) Smoking Tobacco Type  Cigarettes quit in 09/2019      Smokeless Tobacco Use  Never              Alcohol History       Alcohol Use Status  No              Drug Use       Drug Use Status  No              Sexual Activity       Sexually Active  Yes Partners  Male                    Family History   Problem Relation Age of Onset    Severe Sprains Brother     Heart Disease Brother     Cancer Paternal Aunt     Osteoporosis Paternal Uncle     Cancer Mother         esophageal    Heart Disease Father     Diabetes Father     Heart Disease Son        Current Outpatient Medications   Medication Sig Dispense Refill    rosuvastatin (CRESTOR) 20 MG tablet TAKE 1 TABLET BY MOUTH EVERY DAY 30 tablet 5    omeprazole (PRILOSEC) 20 MG delayed release capsule Take 1 capsule by mouth Daily 30 capsule 5    losartan (COZAAR) 25 MG tablet TAKE 1 TABLET BY MOUTH EVERY DAY 90 tablet 1    sertraline (ZOLOFT) 50 MG tablet Take 100 mg by mouth 2 times daily   1    aspirin 81 MG tablet Take 81 mg by mouth daily       No current facility-administered medications for this visit.        Objective:   Vitals:    08/04/22 0936   BP: 122/70   Pulse: 76   SpO2: 93%       Physical Exam   /70 (Site: Left Upper Arm, Position: Sitting, Cuff Size: Medium Adult)   Pulse 76   Wt 135 lb 12.8 oz (61.6 kg)   SpO2 93%   BMI 26.52 kg/m²   General appearance: alert, appears stated age, and cooperative  Throat: lips, mucosa, and tongue normal; teeth and gums normal  Lungs: clear to auscultation bilaterally  Heart: regular rate and rhythm, S1, S2 normal, no murmur, click, rub or gallop  Abdomen: soft, non-tender; bowel sounds normal; no masses,  no organomegaly  Extremities: extremities normal, atraumatic, no cyanosis or edema  Pulses: 2+ and symmetric  Skin: Skin color, 06/30/2022 Negative     Specific Gravity, UA 06/30/2022 <=1.005     Blood, Urine 06/30/2022 TRACE-INTACT (A)    pH, UA 06/30/2022 6.0     Protein, UA 06/30/2022 Negative     Urobilinogen, Urine 06/30/2022 0.2     Nitrite, Urine 06/30/2022 Negative     Leukocyte Esterase, Urine 06/30/2022 Negative     Microscopic Examination 06/30/2022 YES     Urine Type 06/30/2022 NotGiven     Urine Reflex to Culture 06/30/2022 Not Indicated     SARS-CoV-2 RNA, RT PCR 06/30/2022 NOT DETECTED     INFLUENZA A 06/30/2022 NOT DETECTED     INFLUENZA B 06/30/2022 NOT DETECTED     WBC, UA 06/30/2022 0-2     RBC, UA 06/30/2022 0-2     Epithelial Cells, UA 06/30/2022 2-5     Amorphous, UA 06/30/2022 Rare    Admission on 06/29/2022, Discharged on 06/29/2022   Component Date Value    WBC 06/29/2022 8.4     RBC 06/29/2022 4.89     Hemoglobin 06/29/2022 14.0     Hematocrit 06/29/2022 42.4     MCV 06/29/2022 86.6     MCH 06/29/2022 28.6     MCHC 06/29/2022 33.0     RDW 06/29/2022 20.7 (A)    Platelets 60/76/9332 137     MPV 06/29/2022 8.6     Sodium 06/29/2022 140     Potassium reflex Magnesi* 06/29/2022 4.3     Chloride 06/29/2022 101     CO2 06/29/2022 24     Anion Gap 06/29/2022 15     Glucose 06/29/2022 103 (A)    BUN 06/29/2022 10     Creatinine 06/29/2022 0.6     GFR Non- 06/29/2022 >60     GFR  06/29/2022 >60     Calcium 06/29/2022 10.1     Ventricular Rate 06/29/2022 71     Atrial Rate 06/29/2022 71     P-R Interval 06/29/2022 154     QRS Duration 06/29/2022 84     Q-T Interval 06/29/2022 418     QTc Calculation (Bazett) 06/29/2022 454     P Axis 06/29/2022 76     R Scott 06/29/2022 54     T Axis 06/29/2022 64     Diagnosis 06/29/2022 Normal sinus rhythmNormal ECGNo previous ECGs availableConfirmed by Elif Theodore MD, [address] (1987) on 6/29/2022 3:33:30 PM     POC Glucose 06/29/2022 95     Performed on 06/29/2022 ACCU-CHEK    Orders Only on 03/25/2022   Component Date Value    HCV QNT by NAAT IU/ML 03/23/2022 Not Detected     HCV Qnt by NAAT log IU/ml 03/23/2022 Not Detected     Interpretation 03/23/2022 Not Detected    Hospital Outpatient Visit on 03/23/2022   Component Date Value    POC Creatinine 03/23/2022 0.6     GFR Non- 03/23/2022 >60     GFR  03/23/2022 >60     Sample Type 03/23/2022 VIC     Performed on 03/23/2022 SEE BELOW    Orders Only on 03/23/2022   Component Date Value    Ferritin 03/23/2022 11.9 (A)    Iron 03/23/2022 20 (A)    TIBC 03/23/2022 476 (A)    Iron Saturation 03/23/2022 4 (A)    Vitamin B-12 03/23/2022 1022 (A)   Abstract on 03/23/2022   Component Date Value    Cholesterol, Total 03/01/2021 181     HDL 03/01/2021 99 (A)    LDL Calculated 03/01/2021 66     Triglycerides 03/01/2021 82     VLDL 03/01/2021 16    Abstract on 03/23/2022   Component Date Value    Sodium 03/01/2021 140     Chloride 03/01/2021 104     Potassium 03/01/2021 3.9     BUN 03/01/2021 12     Creatinine 03/01/2021 0.73     Glucose 03/01/2021 116     AST 03/01/2021 39     ALT 03/01/2021 66     Calcium 03/01/2021 9.4     Total Protein 03/01/2021 7.1     CO2 03/01/2021 26.6     Albumin 03/01/2021 4.0     Alkaline Phosphatase 03/01/2021 55     Total Bilirubin 03/01/2021 0.32     Gfr Calculated 03/01/2021 79     Anion Gap 03/01/2021 9    Orders Only on 03/23/2022   Component Date Value    Cholesterol, Fasting 03/23/2022 189     Triglyceride, Fasting 03/23/2022 90     HDL 03/23/2022 108 (A)    LDL Calculated 03/23/2022 63     VLDL Cholesterol Calcula* 03/23/2022 18     Sodium 03/23/2022 140     Potassium 03/23/2022 4.0     Chloride 03/23/2022 102     CO2 03/23/2022 22     Anion Gap 03/23/2022 16     Glucose 03/23/2022 102 (A)    BUN 03/23/2022 10     Creatinine 03/23/2022 0.6     GFR Non- 03/23/2022 >60     GFR  03/23/2022 >60     Calcium 03/23/2022 9.5     Total Protein 03/23/2022 7.1     Albumin 03/23/2022 4.4     Albumin/Globulin Ratio 03/23/2022 1.6     Total Bilirubin 03/23/2022 <0.2     Alkaline Phosphatase 03/23/2022 62     ALT 03/23/2022 67 (A)    AST 03/23/2022 76 (A)   Office Visit on 03/23/2022   Component Date Value    WBC 03/23/2022 8.4     RBC 03/23/2022 3.95 (A)    Hemoglobin 03/23/2022 8.4 (A)    Hematocrit 03/23/2022 27.6 (A)    MCV 03/23/2022 69.8 (A)    MCH 03/23/2022 21.2 (A)    MCHC 03/23/2022 30.3 (A)    RDW 03/23/2022 18.8 (A)    Platelets 92/[birth year]/1969 182     MPV 03/23/2022 8.7     Path Consult 03/23/2022 Yes     Neutrophils % 03/23/2022 75.4     Lymphocytes % 03/23/2022 17.8     Monocytes % 03/23/2022 5.7     Eosinophils % 03/23/2022 0.7     Basophils % 03/23/2022 0.4     Neutrophils Absolute 03/23/2022 6.4     Lymphocytes Absolute 03/23/2022 1.5     Monocytes Absolute 03/23/2022 0.5     Eosinophils Absolute 03/23/2022 0.1     Basophils Absolute 03/23/2022 0.0     Anisocytosis 03/23/2022 1+ (A)    Microcytes 03/23/2022 Occasional (A)    Hypochromia 03/23/2022 Occasional (A)    Schistocytes 03/23/2022 Occasional (A)    Ovalocytes 03/23/2022 Occasional (A)    Hemoglobin A1C 03/23/2022 6.4     eAG 03/23/2022 137.0     T4 Free 03/23/2022 1.0     TSH 03/23/2022 4.11     Vit D, 25-Hydroxy 03/23/2022 42.0     BUN 03/23/2022 9     Creatinine 03/23/2022 0.6     GFR Non- 03/23/2022 >60     GFR  03/23/2022 >60     Path Consult 03/23/2022 Reviewed         Assessment:   66 y.o. female with planned surgery as above. - Known risk factors for perioperative complications: None   - Difficulty with intubation is not anticipated. - Current medications which may produce withdrawal symptoms if withheld perioperatively: sertraline    Plan:   1. Preoperative workup as follows hemoglobin, hematocrit, electrolytes, creatinine, glucose, liver function studies   2.  Change in medication regimen before surgery: none, continue med regimen including morning of surgery, w/sip of water   Pt instructed to avoid NSAIDs, ASA, MV, Vitamin E, Fish oil 1 week in detail. Appropriate medical decision making was based on this.          Pt is at an acceptable risk for planned procedure    Please call with any questions    Harry Li CNP

## 2022-08-04 NOTE — Clinical Note
Hi Dr. Sotero Guerrero,  I saw Peg today for a pre op exam.  She saw you in June for an EGD and colonoscopy. H/o hep C, anemia and elevated liver enzymes. Was seen in the ER the day after her procedures for diverticulitis. She states that she has had 2 f/u appointments with you that have been cancelled and rescheduled by your office. The most recent was yesterday. She states that she now can not get in until 8/24/22. I told her that I would reach out to you to see if you could get her in sooner.    Thank you,  Dar Scott, CNP

## 2022-08-05 DIAGNOSIS — E11.69 TYPE 2 DIABETES MELLITUS WITH OTHER SPECIFIED COMPLICATION, WITHOUT LONG-TERM CURRENT USE OF INSULIN (HCC): Primary | ICD-10-CM

## 2022-08-05 LAB
A/G RATIO: 2 (ref 1.1–2.2)
ALBUMIN SERPL-MCNC: 4.8 G/DL (ref 3.4–5)
ALP BLD-CCNC: 64 U/L (ref 40–129)
ALT SERPL-CCNC: 24 U/L (ref 10–40)
ANION GAP SERPL CALCULATED.3IONS-SCNC: 13 MMOL/L (ref 3–16)
AST SERPL-CCNC: 31 U/L (ref 15–37)
BASOPHILS ABSOLUTE: 0 K/UL (ref 0–0.2)
BASOPHILS RELATIVE PERCENT: 0.3 %
BILIRUB SERPL-MCNC: 0.3 MG/DL (ref 0–1)
BUN BLDV-MCNC: 18 MG/DL (ref 7–20)
CALCIUM SERPL-MCNC: 9.7 MG/DL (ref 8.3–10.6)
CHLORIDE BLD-SCNC: 106 MMOL/L (ref 99–110)
CO2: 27 MMOL/L (ref 21–32)
CREAT SERPL-MCNC: 0.6 MG/DL (ref 0.6–1.2)
EOSINOPHILS ABSOLUTE: 0.1 K/UL (ref 0–0.6)
EOSINOPHILS RELATIVE PERCENT: 1.7 %
ESTIMATED AVERAGE GLUCOSE: 139.9 MG/DL
GFR AFRICAN AMERICAN: >60
GFR NON-AFRICAN AMERICAN: >60
GLUCOSE BLD-MCNC: 109 MG/DL (ref 70–99)
HBA1C MFR BLD: 6.5 %
HCT VFR BLD CALC: 41.8 % (ref 36–48)
HEMOGLOBIN: 13.7 G/DL (ref 12–16)
LYMPHOCYTES ABSOLUTE: 1.8 K/UL (ref 1–5.1)
LYMPHOCYTES RELATIVE PERCENT: 27.5 %
MCH RBC QN AUTO: 29.7 PG (ref 26–34)
MCHC RBC AUTO-ENTMCNC: 32.9 G/DL (ref 31–36)
MCV RBC AUTO: 90.4 FL (ref 80–100)
MONOCYTES ABSOLUTE: 0.5 K/UL (ref 0–1.3)
MONOCYTES RELATIVE PERCENT: 7.7 %
NEUTROPHILS ABSOLUTE: 4 K/UL (ref 1.7–7.7)
NEUTROPHILS RELATIVE PERCENT: 62.8 %
PDW BLD-RTO: 15 % (ref 12.4–15.4)
PLATELET # BLD: 152 K/UL (ref 135–450)
PMV BLD AUTO: 8.8 FL (ref 5–10.5)
POTASSIUM REFLEX MAGNESIUM: 4.7 MMOL/L (ref 3.5–5.1)
RBC # BLD: 4.63 M/UL (ref 4–5.2)
SODIUM BLD-SCNC: 146 MMOL/L (ref 136–145)
TOTAL PROTEIN: 7.2 G/DL (ref 6.4–8.2)
WBC # BLD: 6.4 K/UL (ref 4–11)

## 2022-08-13 ENCOUNTER — TELEPHONE (OUTPATIENT)
Dept: FAMILY MEDICINE CLINIC | Age: 72
End: 2022-08-13

## 2022-08-13 NOTE — TELEPHONE ENCOUNTER
Pt called to ask about MRI ordered by Dr. Roro Gomez. Time given to the patient. Instructed her to follow up on Monday with Dr. Albrecht Human office.

## 2022-08-16 ENCOUNTER — HOSPITAL ENCOUNTER (OUTPATIENT)
Age: 72
Discharge: HOME | End: 2022-08-16
Payer: MEDICARE

## 2022-08-16 ENCOUNTER — HOSPITAL ENCOUNTER (OUTPATIENT)
Dept: MRI IMAGING | Age: 72
Discharge: HOME | End: 2022-08-16
Payer: MEDICARE

## 2022-08-16 DIAGNOSIS — R79.89 ABNORMAL LIVER FUNCTION TEST: ICD-10-CM

## 2022-08-16 LAB
BUN BLDV-MCNC: 10 MG/DL (ref 7–20)
CREAT SERPL-MCNC: <0.5 MG/DL (ref 0.6–1.2)
GFR AFRICAN AMERICAN: >60
GFR NON-AFRICAN AMERICAN: >60

## 2022-08-16 PROCEDURE — 74183 MRI ABD W/O CNTR FLWD CNTR: CPT

## 2022-08-16 PROCEDURE — A9579 GAD-BASE MR CONTRAST NOS,1ML: HCPCS | Performed by: INTERNAL MEDICINE

## 2022-08-16 PROCEDURE — 6360000004 HC RX CONTRAST MEDICATION: Performed by: INTERNAL MEDICINE

## 2022-08-16 PROCEDURE — 82565 ASSAY OF CREATININE: CPT

## 2022-08-16 PROCEDURE — 84520 ASSAY OF UREA NITROGEN: CPT

## 2022-08-16 PROCEDURE — 36415 COLL VENOUS BLD VENIPUNCTURE: CPT

## 2022-08-16 RX ADMIN — GADOTERIDOL 12 ML: 279.3 INJECTION, SOLUTION INTRAVENOUS at 11:56

## 2022-10-06 DIAGNOSIS — E11.69 TYPE 2 DIABETES MELLITUS WITH OTHER SPECIFIED COMPLICATION, WITHOUT LONG-TERM CURRENT USE OF INSULIN (HCC): ICD-10-CM

## 2022-10-06 NOTE — TELEPHONE ENCOUNTER
.Refill Request     CONFIRM preferrred pharmacy with the patient. If Mail Order Rx - Pend for 90 day refill. Last Seen: Last Seen Department: 8/4/2022  Last Seen by PCP: 8/4/2022    Last Written: 8-5-22 30 with 5     If no future appointment scheduled, route STAFF MESSAGE with patient name to the [facility] for scheduling. Next Appointment:   Future Appointments   Date Time Provider Tania Mitchell   11/4/2022  9:00 AM Arnol Moose, APRN - CNP [facility]       Message sent to  to schedule appt with patient?   N/A      Requested Prescriptions     Pending Prescriptions Disp Refills    metFORMIN (GLUCOPHAGE) 500 MG tablet [Pharmacy Med Name: METFORMIN  MG TABLET] 90 tablet 1     Sig: TAKE 1 TABLET BY MOUTH EVERY DAY WITH BREAKFAST

## 2022-11-10 ENCOUNTER — OFFICE VISIT (OUTPATIENT)
Dept: FAMILY MEDICINE CLINIC | Age: 72
End: 2022-11-10
Payer: MEDICARE

## 2022-11-10 VITALS
DIASTOLIC BLOOD PRESSURE: 68 MMHG | WEIGHT: 135 LBS | OXYGEN SATURATION: 98 % | SYSTOLIC BLOOD PRESSURE: 128 MMHG | HEART RATE: 72 BPM | BODY MASS INDEX: 26.5 KG/M2 | TEMPERATURE: 97.7 F | HEIGHT: 60 IN | RESPIRATION RATE: 20 BRPM

## 2022-11-10 DIAGNOSIS — Z86.2 HISTORY OF IRON DEFICIENCY ANEMIA: ICD-10-CM

## 2022-11-10 DIAGNOSIS — E11.69 TYPE 2 DIABETES MELLITUS WITH OTHER SPECIFIED COMPLICATION, WITHOUT LONG-TERM CURRENT USE OF INSULIN (HCC): Primary | ICD-10-CM

## 2022-11-10 DIAGNOSIS — Z86.19 HISTORY OF HEPATITIS C: ICD-10-CM

## 2022-11-10 DIAGNOSIS — H81.01 MENIERE DISEASE, RIGHT: ICD-10-CM

## 2022-11-10 DIAGNOSIS — H93.11 TINNITUS, RIGHT: ICD-10-CM

## 2022-11-10 DIAGNOSIS — H69.02 PATULOUS EUSTACHIAN TUBE OF LEFT EAR: ICD-10-CM

## 2022-11-10 DIAGNOSIS — Z12.39 ENCOUNTER FOR SCREENING FOR MALIGNANT NEOPLASM OF BREAST, UNSPECIFIED SCREENING MODALITY: ICD-10-CM

## 2022-11-10 DIAGNOSIS — R74.8 ELEVATED LIVER ENZYMES: ICD-10-CM

## 2022-11-10 LAB — HBA1C MFR BLD: 5.7 %

## 2022-11-10 PROCEDURE — 2022F DILAT RTA XM EVC RTNOPTHY: CPT | Performed by: NURSE PRACTITIONER

## 2022-11-10 PROCEDURE — 99214 OFFICE O/P EST MOD 30 MIN: CPT | Performed by: NURSE PRACTITIONER

## 2022-11-10 PROCEDURE — G8427 DOCREV CUR MEDS BY ELIG CLIN: HCPCS | Performed by: NURSE PRACTITIONER

## 2022-11-10 PROCEDURE — G8417 CALC BMI ABV UP PARAM F/U: HCPCS | Performed by: NURSE PRACTITIONER

## 2022-11-10 PROCEDURE — G8484 FLU IMMUNIZE NO ADMIN: HCPCS | Performed by: NURSE PRACTITIONER

## 2022-11-10 PROCEDURE — 1036F TOBACCO NON-USER: CPT | Performed by: NURSE PRACTITIONER

## 2022-11-10 PROCEDURE — 83036 HEMOGLOBIN GLYCOSYLATED A1C: CPT | Performed by: NURSE PRACTITIONER

## 2022-11-10 PROCEDURE — 3017F COLORECTAL CA SCREEN DOC REV: CPT | Performed by: NURSE PRACTITIONER

## 2022-11-10 PROCEDURE — 1123F ACP DISCUSS/DSCN MKR DOCD: CPT | Performed by: NURSE PRACTITIONER

## 2022-11-10 PROCEDURE — 1090F PRES/ABSN URINE INCON ASSESS: CPT | Performed by: NURSE PRACTITIONER

## 2022-11-10 PROCEDURE — 3044F HG A1C LEVEL LT 7.0%: CPT | Performed by: NURSE PRACTITIONER

## 2022-11-10 PROCEDURE — G8400 PT W/DXA NO RESULTS DOC: HCPCS | Performed by: NURSE PRACTITIONER

## 2022-11-10 NOTE — PROGRESS NOTES
11/10/2022     Charles Forbes (:  1950) is a 67 y.o. female, here for evaluation of the following medical concerns:    HPI  Pt is here today for her 3 month diabetes f/u. A1C on 3/23/22 was 6.4. Pt stopped metformin. A1C on 22 was 6.5. She restarted her metformin 500 mg daily. Monitoring BS at home, states that it average around 105. Denies s/s of hypoglycemia. Today her A1C is 5.7. H/o elevated liver enzymes. Following Dr. Estelita Gomez with GI,  recent liver enzymes were normal.      Declines breast cancer screening. H/o iron deficiency anemia. Dr. Estelita Gomez recommended a daily oral iron supplement. Pt states that she is not taking it b/c she is following [facility] for this and was told that her iron levels was normal.  She has a f/u appt with them tomorrow. States that she is still having fatigue. H/o right ear tinnitus, right ear meniere disease and patulous eustachian tube of left ear. Pt states that she has been following Dr. Kristopher Essex with [address] [place] [address] ENT and would like a second opinion. Review of Systems   All other systems reviewed and are negative. Prior to Visit Medications    Medication Sig Taking?  Authorizing Provider   metFORMIN (GLUCOPHAGE) 500 MG tablet TAKE 1 TABLET BY MOUTH EVERY DAY WITH BREAKFAST Yes VIKI Bello CNP   rosuvastatin (CRESTOR) 20 MG tablet TAKE 1 TABLET BY MOUTH EVERY DAY Yes VIKI Bello CNP   omeprazole (PRILOSEC) 20 MG delayed release capsule Take 1 capsule by mouth Daily Yes VIKI Bello CNP   losartan (COZAAR) 25 MG tablet TAKE 1 TABLET BY MOUTH EVERY DAY Yes VIKI Connor CNP   sertraline (ZOLOFT) 50 MG tablet Take 100 mg by mouth 2 times daily  Yes Historical Provider, MD   aspirin 81 MG tablet Take 81 mg by mouth daily Yes Historical Provider, MD        Social History     Tobacco Use    Smoking status: Former     Packs/day: 0.50     Years: 30.00     Pack years: 15.00     Types: Cigarettes     Quit date: 09/2019     Years since quitting: 3.1    Smokeless tobacco: Never   Substance Use Topics    Alcohol use: No     Alcohol/week: 0.0 standard drinks        Vitals:    11/10/22 0902   BP: 128/68   Site: Left Upper Arm   Position: Sitting   Pulse: 72   Resp: 20   Temp: 97.7 °F (36.5 °C)   SpO2: 98%   Weight: 135 lb (61.2 kg)   Height: 5' (1.524 m)     Estimated body mass index is 26.37 kg/m² as calculated from the following:    Height as of this encounter: 5' (1.524 m). Weight as of this encounter: 135 lb (61.2 kg). Physical Exam  Vitals reviewed. Constitutional:       Appearance: Normal appearance. HENT:      Head: Normocephalic. Cardiovascular:      Rate and Rhythm: Normal rate and regular rhythm. Heart sounds: Normal heart sounds. Pulmonary:      Effort: Pulmonary effort is normal.      Breath sounds: Normal breath sounds. Skin:     General: Skin is warm and dry. Neurological:      General: No focal deficit present. Mental Status: She is alert and oriented to person, place, and time. Psychiatric:         Mood and Affect: Mood normal.         Behavior: Behavior normal.         Thought Content: Thought content normal.         Judgment: Judgment normal.       ASSESSMENT/PLAN:    1. Type 2 diabetes mellitus with other specified complication, without long-term current use of insulin (HCC)  A1C is down to 5.7. Advised pt to continue metformin 500 mg daily with breakfast.  Continue monitoring BS at home. F/u with me in 6 months or sooner if needed. - POCT glycosylated hemoglobin (Hb A1C)    2. Elevated liver enzymes  Following Dr. Edgard Valadez with GI.    3. History of hepatitis C  Following Dr. Edgard Valadez with GI.    4. History of iron deficiency anemia  She is not taking an oral iron supplement. This is being managed by [facility]. States that she has a f/u appt with them tomorrow. Recommended that she ask them to send me records if she is ok with that. 5. Tinnitus, right  See HPI.   Pt would like a second opinion.   - Benigno Delaney DO, OtolaryngologyJonathan    6. Meniere disease, right  See HPI. Pt would like a second opinion.   - Benigno Delaney DO, Otolaryngology, [place]    7. PSee HPI. Pt would like a second opinion. atulous eustachian tube of left ear    - Moriah Patton DO, OtolaryngologJonathan [place]    8. Encounter for screening for malignant neoplasm of breast, unspecified screening modality  Pt declines any further screening tests for breast cancer. Return in about 6 months (around 5/10/2023) for Diabetes, Hyperlipidemia. An electronic signature was used to authenticate this note. --Luis Reece, VIKI - CNP on 11/10/2022 at 9:39 AM    I have spent 35+ minutes with this pt encounter today.

## 2022-11-25 DIAGNOSIS — I10 ESSENTIAL (PRIMARY) HYPERTENSION: ICD-10-CM

## 2022-11-25 DIAGNOSIS — I10 BENIGN ESSENTIAL HYPERTENSION: ICD-10-CM

## 2022-11-25 NOTE — TELEPHONE ENCOUNTER
Refill Request     CONFIRM preferrred pharmacy with the patient. If Mail Order Rx - Pend for 90 day refill. Last Seen: Last Seen Department: 11/10/2022  Last Seen by PCP: Visit date not found    Last Written: 3/28/2022 90 tablet 1 refills    If no future appointment scheduled, route STAFF MESSAGE with patient name to the [facility] for scheduling. Next Appointment:   No future appointments. Message sent to [address] [place] [address] to schedule appt with patient? YES    Return in about 6 months (around 5/10/2023) for Diabetes, Hyperlipidemia.     Requested Prescriptions     Pending Prescriptions Disp Refills    losartan (COZAAR) 25 MG tablet [Pharmacy Med Name: LOSARTAN POTASSIUM 25 MG TAB] 90 tablet 1     Sig: TAKE 1 TABLET BY MOUTH EVERY DAY

## 2022-11-28 NOTE — TELEPHONE ENCOUNTER
Pt called back. She states she hasnt taken this medication in 5 to 6 months because she believes Servando Jaxson took her off of it. Pt wants to know if she wants her to start taking this again.      She wanted to let Renu Montanez know that MARLEN BRUNO is sending her to the [facility] and its scheduled for 12/14/22

## 2022-11-29 RX ORDER — LOSARTAN POTASSIUM 25 MG/1
TABLET ORAL
Qty: 90 TABLET | Refills: 1 | Status: SHIPPED | OUTPATIENT
Start: 2022-11-29 | End: 2022-11-29

## 2022-12-15 RX ORDER — ROSUVASTATIN CALCIUM 20 MG/1
TABLET, COATED ORAL
Qty: 90 TABLET | Refills: 1 | Status: SHIPPED | OUTPATIENT
Start: 2022-12-15

## 2022-12-15 RX ORDER — OMEPRAZOLE 20 MG/1
CAPSULE, DELAYED RELEASE ORAL
Qty: 90 CAPSULE | Refills: 1 | Status: SHIPPED | OUTPATIENT
Start: 2022-12-15

## 2022-12-15 NOTE — TELEPHONE ENCOUNTER
Refill Request     CONFIRM preferrred pharmacy with the patient. If Mail Order Rx - Pend for 90 day refill. Last Seen: Last Seen Department: 11/10/2022  Last Seen by PCP: 11/10/2022    Last Written: Omeprazole 6/24/2022, #30, 5 refills. Rosuvastatin 6/24/2022, #30, 5 refills      If no future appointment scheduled, route STAFF MESSAGE with patient name to the [facility] for scheduling. Next Appointment:   No future appointments. Message sent to [address] [place] [address] to schedule appt with patient?   NO      Requested Prescriptions     Pending Prescriptions Disp Refills    omeprazole (PRILOSEC) 20 MG delayed release capsule [Pharmacy Med Name: OMEPRAZOLE DR 20 MG CAPSULE] 90 capsule 1     Sig: TAKE 1 CAPSULE BY MOUTH EVERY DAY    rosuvastatin (CRESTOR) 20 MG tablet [Pharmacy Med Name: ROSUVASTATIN CALCIUM 20 MG TAB] 90 tablet 1     Sig: TAKE 1 TABLET BY MOUTH EVERY DAY

## 2023-01-18 ENCOUNTER — TELEPHONE (OUTPATIENT)
Dept: FAMILY MEDICINE CLINIC | Age: 73
End: 2023-01-18

## 2023-05-09 ENCOUNTER — OFFICE VISIT (OUTPATIENT)
Dept: FAMILY MEDICINE CLINIC | Age: 73
End: 2023-05-09

## 2023-05-09 VITALS
HEART RATE: 71 BPM | SYSTOLIC BLOOD PRESSURE: 128 MMHG | DIASTOLIC BLOOD PRESSURE: 64 MMHG | OXYGEN SATURATION: 99 % | WEIGHT: 137 LBS | BODY MASS INDEX: 26.9 KG/M2 | HEIGHT: 60 IN

## 2023-05-09 DIAGNOSIS — B02.29 POSTHERPETIC NEURALGIA: ICD-10-CM

## 2023-05-09 DIAGNOSIS — Z00.00 MEDICARE ANNUAL WELLNESS VISIT, SUBSEQUENT: Primary | ICD-10-CM

## 2023-05-09 DIAGNOSIS — E11.69 TYPE 2 DIABETES MELLITUS WITH OTHER SPECIFIED COMPLICATION, WITHOUT LONG-TERM CURRENT USE OF INSULIN (HCC): ICD-10-CM

## 2023-05-09 DIAGNOSIS — I73.81: ICD-10-CM

## 2023-05-09 DIAGNOSIS — E78.5 HYPERLIPIDEMIA, UNSPECIFIED HYPERLIPIDEMIA TYPE: ICD-10-CM

## 2023-05-09 DIAGNOSIS — H81.01 MENIERE DISEASE, RIGHT: ICD-10-CM

## 2023-05-09 DIAGNOSIS — K21.9 GASTROESOPHAGEAL REFLUX DISEASE, UNSPECIFIED WHETHER ESOPHAGITIS PRESENT: ICD-10-CM

## 2023-05-09 DIAGNOSIS — I70.0 AORTIC CALCIFICATION (HCC): ICD-10-CM

## 2023-05-09 DIAGNOSIS — H93.233 HYPERACUSIS OF BOTH EARS: ICD-10-CM

## 2023-05-09 LAB
ANION GAP SERPL CALCULATED.3IONS-SCNC: 11 MMOL/L (ref 3–16)
BUN SERPL-MCNC: 11 MG/DL (ref 7–20)
CALCIUM SERPL-MCNC: 10.2 MG/DL (ref 8.3–10.6)
CHLORIDE SERPL-SCNC: 103 MMOL/L (ref 99–110)
CHOLEST SERPL-MCNC: 190 MG/DL (ref 0–199)
CO2 SERPL-SCNC: 27 MMOL/L (ref 21–32)
CREAT SERPL-MCNC: 0.5 MG/DL (ref 0.6–1.2)
GFR SERPLBLD CREATININE-BSD FMLA CKD-EPI: >60 ML/MIN/{1.73_M2}
GLUCOSE SERPL-MCNC: 104 MG/DL (ref 70–99)
HDLC SERPL-MCNC: 118 MG/DL (ref 40–60)
LDLC SERPL CALC-MCNC: 56 MG/DL
POTASSIUM SERPL-SCNC: 4.5 MMOL/L (ref 3.5–5.1)
SODIUM SERPL-SCNC: 141 MMOL/L (ref 136–145)
TRIGL SERPL-MCNC: 78 MG/DL (ref 0–150)
VLDLC SERPL CALC-MCNC: 16 MG/DL

## 2023-05-09 RX ORDER — GABAPENTIN 100 MG/1
CAPSULE ORAL
COMMUNITY
Start: 2023-02-24 | End: 2023-05-09 | Stop reason: SDUPTHER

## 2023-05-09 RX ORDER — GABAPENTIN 300 MG/1
300 CAPSULE ORAL 2 TIMES DAILY
Qty: 180 CAPSULE | Refills: 1 | Status: SHIPPED | OUTPATIENT
Start: 2023-05-09 | End: 2023-08-07

## 2023-05-09 RX ORDER — OMEPRAZOLE 20 MG/1
CAPSULE, DELAYED RELEASE ORAL
Qty: 90 CAPSULE | Refills: 1 | Status: SHIPPED | OUTPATIENT
Start: 2023-05-09

## 2023-05-09 RX ORDER — ROSUVASTATIN CALCIUM 20 MG/1
20 TABLET, COATED ORAL DAILY
Qty: 90 TABLET | Refills: 1 | Status: SHIPPED | OUTPATIENT
Start: 2023-05-09

## 2023-05-09 SDOH — ECONOMIC STABILITY: INCOME INSECURITY: HOW HARD IS IT FOR YOU TO PAY FOR THE VERY BASICS LIKE FOOD, HOUSING, MEDICAL CARE, AND HEATING?: NOT HARD AT ALL

## 2023-05-09 SDOH — ECONOMIC STABILITY: FOOD INSECURITY: WITHIN THE PAST 12 MONTHS, THE FOOD YOU BOUGHT JUST DIDN'T LAST AND YOU DIDN'T HAVE MONEY TO GET MORE.: NEVER TRUE

## 2023-05-09 SDOH — ECONOMIC STABILITY: FOOD INSECURITY: WITHIN THE PAST 12 MONTHS, YOU WORRIED THAT YOUR FOOD WOULD RUN OUT BEFORE YOU GOT MONEY TO BUY MORE.: NEVER TRUE

## 2023-05-09 ASSESSMENT — PATIENT HEALTH QUESTIONNAIRE - PHQ9
2. FEELING DOWN, DEPRESSED OR HOPELESS: 2
SUM OF ALL RESPONSES TO PHQ QUESTIONS 1-9: 0
SUM OF ALL RESPONSES TO PHQ9 QUESTIONS 1 & 2: 4
SUM OF ALL RESPONSES TO PHQ QUESTIONS 1-9: 4
SUM OF ALL RESPONSES TO PHQ QUESTIONS 1-9: 4
DEPRESSION UNABLE TO ASSESS: PT REFUSES
1. LITTLE INTEREST OR PLEASURE IN DOING THINGS: 0
1. LITTLE INTEREST OR PLEASURE IN DOING THINGS: 2
SUM OF ALL RESPONSES TO PHQ QUESTIONS 1-9: 0
SUM OF ALL RESPONSES TO PHQ QUESTIONS 1-9: 4
SUM OF ALL RESPONSES TO PHQ QUESTIONS 1-9: 0
SUM OF ALL RESPONSES TO PHQ QUESTIONS 1-9: 0
SUM OF ALL RESPONSES TO PHQ QUESTIONS 1-9: 4

## 2023-05-09 ASSESSMENT — LIFESTYLE VARIABLES
HOW OFTEN DO YOU HAVE A DRINK CONTAINING ALCOHOL: NEVER
HOW MANY STANDARD DRINKS CONTAINING ALCOHOL DO YOU HAVE ON A TYPICAL DAY: PATIENT DOES NOT DRINK

## 2023-05-09 NOTE — PROGRESS NOTES
Medicare Annual Wellness Visit    [facility] is here for No chief complaint on file. Assessment & Plan     Medicare annual wellness visit, subsequent  Pt refuses vaccines and cancer screenings. Type 2 diabetes mellitus with other specified complication, without long-term current use of insulin (HCC)  We are currently out of POC A1C's. Will f/u with pt regarding results and plan. -     metFORMIN (GLUCOPHAGE) 500 MG tablet; Take 1 tablet by mouth daily (with breakfast), Disp-90 tablet, R-1Normal  -     Hemoglobin A1C  -     Basic Metabolic Panel; Future    Gastroesophageal reflux disease, unspecified whether esophagitis present  Symptoms are stable. Continue omeprazole as directed. -     omeprazole (PRILOSEC) 20 MG delayed release capsule; TAKE 1 CAPSULE BY MOUTH EVERY DAY, Disp-90 capsule, R-1Normal    Hyperlipidemia, unspecified hyperlipidemia type  Recent lipid panels have been normal.  H/o aortic calcification. Continue statin as directed. -     Lipid Panel; Future    Meniere disease, right  Following ENT. -     Handicap Placard MISC; Starting Tue 5/9/2023, Disp-1 each, R-0, Print    Aortic calcification (HCC)  Continue statin as directed. Kyle's disease (erythromelalgia) ([facility] [address]  Pt requests increase in dose of gabapentin d/t increase in pain. -     Handicap Placard MISC; Starting Tue 5/9/2023, Disp-1 each, R-0, Print  -     gabapentin (NEURONTIN) 300 MG capsule; Take 1 capsule by mouth in the morning and at bedtime for 90 days. , Disp-180 capsule, R-1Normal    Hyperacusis of both ears  Following Neuro at [facility] and ENT. Need to speak quietly with pt to avoid pain for her. She also reads lips. Postherpetic neuralgia  -     Handicap Placard MISC; Starting Tue 5/9/2023, Disp-1 each, R-0, Print  -     gabapentin (NEURONTIN) 300 MG capsule; Take 1 capsule by mouth in the morning and at bedtime for 90 days. , Disp-180 capsule, R-1Normal      Recommendations

## 2023-05-09 NOTE — PATIENT INSTRUCTIONS
Too much alcohol can cause health problems.     Manage other health problems such as diabetes, high blood pressure, and high cholesterol. If you think you may have a problem with alcohol or drug use, talk to your doctor. Medicines    Take your medicines exactly as prescribed. Call your doctor if you think you are having a problem with your medicine.     If your doctor recommends aspirin, take the amount directed each day. Make sure you take aspirin and not another kind of pain reliever, such as acetaminophen (Tylenol). When should you call for help? Call 911 if you have symptoms of a heart attack. These may include:    Chest pain or pressure, or a strange feeling in the chest.     Sweating.     Shortness of breath.     Pain, pressure, or a strange feeling in the back, neck, jaw, or upper belly or in one or both shoulders or arms.     Lightheadedness or sudden weakness.     A fast or irregular heartbeat. After you call 911, the  may tell you to chew 1 adult-strength or 2 to 4 low-dose aspirin. Wait for an ambulance. Do not try to drive yourself. Watch closely for changes in your health, and be sure to contact your doctor if you have any problems. Where can you learn more? Go to http://www.sifuentes.com/ and enter F075 to learn more about \"A Healthy Heart: Care Instructions. \"  Current as of: September 7, 2022               Content Version: 13.6  © 5052-1396 [employer], [employer]. Care instructions adapted under license by [facility] ([facility]). If you have questions about a medical condition or this instruction, always ask your healthcare professional. [place] [address] any warranty or liability for your use of this information. Personalized Preventive Plan for Jeanine Half - 5/9/2023  Medicare offers a range of preventive health benefits. Some of the tests and screenings are paid in full while other may be subject to a deductible, co-insurance, and/or copay.     Some

## 2023-05-10 LAB
EST. AVERAGE GLUCOSE BLD GHB EST-MCNC: 122.6 MG/DL
HBA1C MFR BLD: 5.9 %

## 2023-08-14 ENCOUNTER — HOSPITAL ENCOUNTER (EMERGENCY)
Age: 73
Discharge: HOME | End: 2023-08-14
Attending: STUDENT IN AN ORGANIZED HEALTH CARE EDUCATION/TRAINING PROGRAM
Payer: MEDICARE

## 2023-08-14 VITALS
WEIGHT: 139.4 LBS | TEMPERATURE: 98.5 F | BODY MASS INDEX: 27.37 KG/M2 | RESPIRATION RATE: 16 BRPM | SYSTOLIC BLOOD PRESSURE: 148 MMHG | HEIGHT: 60 IN | DIASTOLIC BLOOD PRESSURE: 68 MMHG | HEART RATE: 90 BPM | OXYGEN SATURATION: 97 %

## 2023-08-14 DIAGNOSIS — S50.862A NONVENOMOUS INSECT BITE OF LEFT FOREARM WITHOUT INFECTION, INITIAL ENCOUNTER: Primary | ICD-10-CM
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
## 2023-08-14 PROCEDURE — 99283 EMERGENCY DEPT VISIT LOW MDM: CPT

## 2023-08-14 RX ORDER — CEPHALEXIN 500 MG/1
500 CAPSULE ORAL 4 TIMES DAILY
Qty: 28 CAPSULE | Refills: 0 | Status: SHIPPED | OUTPATIENT
Start: 2023-08-14 | End: 2023-08-21

## 2023-08-14 ASSESSMENT — PAIN DESCRIPTION - ONSET: ONSET: ON-GOING

## 2023-08-14 ASSESSMENT — PAIN SCALES - GENERAL: PAINLEVEL_OUTOF10: 6

## 2023-08-14 ASSESSMENT — PAIN DESCRIPTION - FREQUENCY: FREQUENCY: CONTINUOUS

## 2023-08-14 ASSESSMENT — PAIN DESCRIPTION - ORIENTATION: ORIENTATION: LEFT

## 2023-08-14 ASSESSMENT — PAIN - FUNCTIONAL ASSESSMENT: PAIN_FUNCTIONAL_ASSESSMENT: 0-10

## 2023-08-14 ASSESSMENT — PAIN DESCRIPTION - LOCATION: LOCATION: ARM

## 2023-08-14 NOTE — ED PROVIDER NOTES
[address] [address] ED     EMERGENCY DEPARTMENT ENCOUNTER         Pt Name: Everardo Almonte   MRN: [id number]   [address] [facility] 1950   Date of evaluation: 8/14/2023   Provider: Gwyndolyn Olszewski, MD   PCP: VIKI Polk CNP   Note Started: 7:58 AM EDT 8/14/23       Chief Complaint     Insect Bite (Pt states she was stung to her left arm last night. Pt with slight redness and swelling to site. C/o pain. Came into day because she was worried it might be getting infected.)      History of Present Illness     Everardo Almonte is a 67 y.o. female who presents with insect bite to the left forearm. The patient states she had a longsleeve shirt on and sustained a single wound to the left forearm of unspecified hymenoptera. No reported stinger less likely a honeybee and favor this represents a yellowjacket or similar wasp. She sustained no other injuries. The wound has been pruritic and painful with some light redness at the site. She researched possible complications and fearing the development of cellulitis today presents for evaluation. She has otherwise been in baseline health and has no other acute complaints. I have reviewed the nursing notes and agree unless otherwise noted. Review of Systems     Positives and pertinent negatives as per HPI. Past Medical, Surgical, Family, and Social History     She has a past medical history of Diabetes mellitus ([address]), Gastritis, GERD (gastroesophageal reflux disease), Hepatitis C antibody positive in blood, Hyperlipidemia, Neuralgia, Pes anserinus bursitis of right knee, Primary osteoarthritis of right knee, and Psoriatic arthritis ([address]). She has a past surgical history that includes knee surgery (Right); Ankle fracture surgery (Right); Cholecystectomy; Hysterectomy; shoulder surgery (Right); Carpal tunnel release (Bilateral); Breast surgery (Right); other surgical history;  Tubal ligation; Bunionectomy (Right); joint replacement (Right, 2004);

## 2023-08-14 NOTE — DISCHARGE INSTRUCTIONS
You were evaluated in the emergency department for insect bite. Assessments and testing completed during your visit were reassuring and at this time there is no indication for further testing, treatment or admission to the hospital. Given this it is appropriate to discharge you from the emergency department. At the time of discharge we discussed the following: As we discussed I expect your condition to improve without treatment in the coming 1 to 2 days however if you notice worsening of your wound including spreading redness warmth increasing tenderness/pain hardening of the skin or more systemic symptoms including fever please begin the antibiotic as prescribed and discuss further with your primary. Please note that sometimes it is difficult to diagnose a medical condition early in the disease process before the disease is fully manifest. Because of this, should you develop any new or worsening symptoms, you may return at any time to the emergency department for another evaluation. If available you are also recommended to review this visit with your primary care physician or other medical provider in the next 7 days. Thank you for allowing us to care for you today.

## 2024-01-23 ENCOUNTER — HOSPITAL ENCOUNTER (OUTPATIENT)
Dept: CT IMAGING | Age: 74
Discharge: HOME | End: 2024-01-23
Payer: MEDICARE

## 2024-01-23 DIAGNOSIS — F17.200 SMOKER: ICD-10-CM

## 2024-01-23 PROCEDURE — 71271 CT THORAX LUNG CANCER SCR C-: CPT

## 2024-04-19 ENCOUNTER — HOSPITAL ENCOUNTER (OUTPATIENT)
Dept: CT IMAGING | Age: 74
Discharge: HOME | End: 2024-04-19
Attending: INTERNAL MEDICINE
Payer: MEDICARE

## 2024-04-19 ENCOUNTER — HOSPITAL ENCOUNTER (OUTPATIENT)
Dept: VASCULAR LAB | Age: 74
Discharge: HOME | End: 2024-04-19
Payer: MEDICARE

## 2024-04-19 DIAGNOSIS — D50.9 IRON DEFICIENCY ANEMIA, UNSPECIFIED IRON DEFICIENCY ANEMIA TYPE: ICD-10-CM

## 2024-04-19 DIAGNOSIS — K75.89 HEPATITIS CHOLESTATIC: ICD-10-CM

## 2024-04-19 DIAGNOSIS — R60.0 LEG EDEMA: ICD-10-CM

## 2024-04-19 PROCEDURE — 6360000004 HC RX CONTRAST MEDICATION: Performed by: INTERNAL MEDICINE

## 2024-04-19 PROCEDURE — 74177 CT ABD & PELVIS W/CONTRAST: CPT

## 2024-04-19 PROCEDURE — 93971 EXTREMITY STUDY: CPT

## 2024-04-19 RX ADMIN — DIATRIZOATE MEGLUMINE AND DIATRIZOATE SODIUM 12 ML: 660; 100 LIQUID ORAL; RECTAL at 11:15

## 2024-04-19 RX ADMIN — IOPAMIDOL 75 ML: 755 INJECTION, SOLUTION INTRAVENOUS at 11:15

## 2024-06-19 NOTE — PROGRESS NOTES
Shortness of breath with exertion  Lower extremity edema  Abdominal fullness  Exertional fatigue    Coronary artery calcifications per CT lung screen   Hyperlipidemia    Diabetes mellitus type 2-well-controlled    Depression  Family Hx of CAD    Patient Active Problem List   Diagnosis    Tailor's bunion    Pes anserinus bursitis of right knee    Primary osteoarthritis of right knee    Impingement syndrome of left shoulder    Primary osteoarthritis of left shoulder    Tear of left rotator cuff    Kyle's disease (erythromelalgia) (HCC)    History of iron deficiency anemia    Elevated liver enzymes    History of hepatitis C    Tinnitus, right    Patulous eustachian tube of left ear    Meniere disease, right    Coronary artery calcification seen on CAT scan    Shortness of breath    Other fatigue    Family history of coronary artery disease       PLAN:  Continue conservative measures for dependent edema: Elevate your legs to heart level while at rest, low salt diet, compression stockings as tolerated.   We recommend trying these measures first before taking a diuretic.   I will follow-up echocardiogram scheduled Tuesday next week.  Suspicion for congestive heart failure however remains lower as clinical history is not congruent with diagnosis  Stress Myoview for further evaluation of exertional dyspnea, exertional intolerance, coronary calcification by CT    Follow up dependent upon results     Scribe's attestation:  This note was scribed in the presence of Dr. Jeremy Ba MD by Dinah Bustillos RN    The scribe’s documentation has been prepared under my direction and personally reviewed by me in its entirety.  I confirm that the note above accurately reflects all work, treatment, procedures, and medical decision making performed by me.  I, Jeremy Ba MD, personally performed the services described in this documentation as scribed by Dinah Bustillos RN in my presence, and it is both accurate and complete to the best

## 2024-06-20 ENCOUNTER — OFFICE VISIT (OUTPATIENT)
Dept: CARDIOLOGY CLINIC | Age: 74
End: 2024-06-20
Payer: MEDICARE

## 2024-06-20 VITALS
DIASTOLIC BLOOD PRESSURE: 80 MMHG | HEART RATE: 87 BPM | WEIGHT: 140.8 LBS | BODY MASS INDEX: 27.64 KG/M2 | OXYGEN SATURATION: 98 % | HEIGHT: 60 IN | SYSTOLIC BLOOD PRESSURE: 140 MMHG

## 2024-06-20 DIAGNOSIS — R06.02 SHORTNESS OF BREATH: ICD-10-CM

## 2024-06-20 DIAGNOSIS — I25.10 CORONARY ARTERY CALCIFICATION SEEN ON CAT SCAN: Primary | ICD-10-CM

## 2024-06-20 DIAGNOSIS — R53.83 OTHER FATIGUE: ICD-10-CM

## 2024-06-20 DIAGNOSIS — Z82.49 FAMILY HISTORY OF CORONARY ARTERY DISEASE: ICD-10-CM

## 2024-06-20 PROCEDURE — 93000 ELECTROCARDIOGRAM COMPLETE: CPT | Performed by: INTERNAL MEDICINE

## 2024-06-20 PROCEDURE — 1036F TOBACCO NON-USER: CPT | Performed by: INTERNAL MEDICINE

## 2024-06-20 PROCEDURE — 99204 OFFICE O/P NEW MOD 45 MIN: CPT | Performed by: INTERNAL MEDICINE

## 2024-06-20 PROCEDURE — G8419 CALC BMI OUT NRM PARAM NOF/U: HCPCS | Performed by: INTERNAL MEDICINE

## 2024-06-20 PROCEDURE — 1090F PRES/ABSN URINE INCON ASSESS: CPT | Performed by: INTERNAL MEDICINE

## 2024-06-20 PROCEDURE — 1123F ACP DISCUSS/DSCN MKR DOCD: CPT | Performed by: INTERNAL MEDICINE

## 2024-06-20 PROCEDURE — G8427 DOCREV CUR MEDS BY ELIG CLIN: HCPCS | Performed by: INTERNAL MEDICINE

## 2024-06-20 PROCEDURE — G8400 PT W/DXA NO RESULTS DOC: HCPCS | Performed by: INTERNAL MEDICINE

## 2024-06-20 PROCEDURE — 3017F COLORECTAL CA SCREEN DOC REV: CPT | Performed by: INTERNAL MEDICINE

## 2024-06-20 RX ORDER — BUSPIRONE HYDROCHLORIDE 5 MG/1
5 TABLET ORAL 2 TIMES DAILY
COMMUNITY
Start: 2022-06-27 | End: 2024-07-29

## 2024-06-20 NOTE — PATIENT INSTRUCTIONS
PLAN:  Continue conservative measures for dependent edema: Elevate your legs to heart level while at rest, low salt diet, compression stockings as tolerated.   We recommend trying these measures first before taking a diuretic.   Agree with obtaining the echocardiogram ordered by Dr. Jeff.   PLEASE have this forwarded to our office once completed.  3.    Recommend Stress test to risk stratify    We will call you with the results .   To schedule outpatient testing, contact Central Scheduling by calling [address] [place] [address] ([phone]).  4.    Would consider pulmonary evaluation and testing if cardiac testing is normal.

## 2024-06-25 ENCOUNTER — HOSPITAL ENCOUNTER (OUTPATIENT)
Dept: CARDIOLOGY | Age: 74
Discharge: HOME | End: 2024-06-27
Attending: INTERNAL MEDICINE
Payer: MEDICARE

## 2024-06-25 VITALS
BODY MASS INDEX: 27.48 KG/M2 | HEIGHT: 60 IN | DIASTOLIC BLOOD PRESSURE: 80 MMHG | SYSTOLIC BLOOD PRESSURE: 140 MMHG | WEIGHT: 140 LBS

## 2024-06-25 DIAGNOSIS — R60.0 LOCALIZED EDEMA: ICD-10-CM

## 2024-06-25 LAB
ECHO AO ASC DIAM: 3.3 CM
ECHO AO ASCENDING AORTA INDEX: 2.06 CM/M2
ECHO AO ROOT DIAM: 2.7 CM
ECHO AO ROOT INDEX: 1.69 CM/M2
ECHO AV AREA PEAK VELOCITY: 1.9 CM2
ECHO AV AREA VTI: 1.8 CM2
ECHO AV AREA/BSA PEAK VELOCITY: 1.2 CM2/M2
ECHO AV AREA/BSA VTI: 1.1 CM2/M2
ECHO AV MEAN GRADIENT: 3 MMHG
ECHO AV MEAN VELOCITY: 0.8 M/S
ECHO AV PEAK GRADIENT: 6 MMHG
ECHO AV PEAK VELOCITY: 1.2 M/S
ECHO AV VELOCITY RATIO: 0.67
ECHO AV VTI: 30.4 CM
ECHO BSA: 1.64 M2
ECHO EST RA PRESSURE: 3 MMHG
ECHO LA AREA 2C: 16.3 CM2
ECHO LA AREA 4C: 16.2 CM2
ECHO LA DIAMETER INDEX: 1.94 CM/M2
ECHO LA DIAMETER: 3.1 CM
ECHO LA MAJOR AXIS: 5.4 CM
ECHO LA MINOR AXIS: 4.7 CM
ECHO LA TO AORTIC ROOT RATIO: 1.15
ECHO LA VOL BP: 45 ML (ref 22–52)
ECHO LA VOL MOD A2C: 45 ML (ref 22–52)
ECHO LA VOL MOD A4C: 39 ML (ref 22–52)
ECHO LA VOL/BSA BIPLANE: 28 ML/M2 (ref 16–34)
ECHO LA VOLUME INDEX MOD A2C: 28 ML/M2 (ref 16–34)
ECHO LA VOLUME INDEX MOD A4C: 24 ML/M2 (ref 16–34)
ECHO LV E' LATERAL VELOCITY: 9 CM/S
ECHO LV E' SEPTAL VELOCITY: 6 CM/S
ECHO LV EDV A2C: 52 ML
ECHO LV EDV A4C: 62 ML
ECHO LV EDV INDEX A4C: 39 ML/M2
ECHO LV EDV NDEX A2C: 33 ML/M2
ECHO LV EJECTION FRACTION A2C: 54 %
ECHO LV EJECTION FRACTION A4C: 60 %
ECHO LV EJECTION FRACTION BIPLANE: 59 % (ref 55–100)
ECHO LV ESV A2C: 24 ML
ECHO LV ESV A4C: 25 ML
ECHO LV ESV INDEX A2C: 15 ML/M2
ECHO LV ESV INDEX A4C: 16 ML/M2
ECHO LV FRACTIONAL SHORTENING: 36 % (ref 28–44)
ECHO LV INTERNAL DIMENSION DIASTOLE INDEX: 2.81 CM/M2
ECHO LV INTERNAL DIMENSION DIASTOLIC: 4.5 CM (ref 3.9–5.3)
ECHO LV INTERNAL DIMENSION SYSTOLIC INDEX: 1.81 CM/M2
ECHO LV INTERNAL DIMENSION SYSTOLIC: 2.9 CM
ECHO LV IVSD: 1.4 CM (ref 0.6–0.9)
ECHO LV MASS 2D: 175 G (ref 67–162)
ECHO LV MASS INDEX 2D: 109.4 G/M2 (ref 43–95)
ECHO LV POSTERIOR WALL DIASTOLIC: 0.8 CM (ref 0.6–0.9)
ECHO LV RELATIVE WALL THICKNESS RATIO: 0.36
ECHO LVOT AREA: 2.8 CM2
ECHO LVOT AV VTI INDEX: 0.65
ECHO LVOT DIAM: 1.9 CM
ECHO LVOT MEAN GRADIENT: 2 MMHG
ECHO LVOT PEAK GRADIENT: 2 MMHG
ECHO LVOT PEAK VELOCITY: 0.8 M/S
ECHO LVOT STROKE VOLUME INDEX: 34.9 ML/M2
ECHO LVOT SV: 55.8 ML
ECHO LVOT VTI: 19.7 CM
ECHO MV A VELOCITY: 0.64 M/S
ECHO MV E DECELERATION TIME (DT): 224 MS
ECHO MV E VELOCITY: 0.91 M/S
ECHO MV E/A RATIO: 1.42
ECHO MV E/E' LATERAL: 10.11
ECHO MV E/E' RATIO (AVERAGED): 12.64
ECHO MV E/E' SEPTAL: 15.17
ECHO RV TAPSE: 1.9 CM (ref 1.7–?)

## 2024-06-25 PROCEDURE — 93306 TTE W/DOPPLER COMPLETE: CPT

## 2024-06-25 PROCEDURE — 93306 TTE W/DOPPLER COMPLETE: CPT | Performed by: INTERNAL MEDICINE

## 2024-07-03 ENCOUNTER — HOSPITAL ENCOUNTER (OUTPATIENT)
Age: 74
Discharge: HOME | End: 2024-07-05
Attending: INTERNAL MEDICINE
Payer: MEDICARE

## 2024-07-03 ENCOUNTER — HOSPITAL ENCOUNTER (OUTPATIENT)
Dept: NUCLEAR MEDICINE | Age: 74
Discharge: HOME | End: 2024-07-03
Attending: INTERNAL MEDICINE
Payer: MEDICARE

## 2024-07-03 DIAGNOSIS — R06.02 SHORTNESS OF BREATH: ICD-10-CM

## 2024-07-03 DIAGNOSIS — R53.83 OTHER FATIGUE: ICD-10-CM

## 2024-07-03 LAB
NUC STRESS EJECTION FRACTION: 75 %
NUC STRESS LV EDV: 48 ML (ref 56–104)
NUC STRESS LV ESV: 12 ML (ref 19–49)
NUC STRESS LV MASS: 85 G
STRESS BASELINE DIAS BP: 70 MMHG
STRESS BASELINE HR: 59 BPM
STRESS BASELINE SYS BP: 142 MMHG
STRESS ESTIMATED WORKLOAD: 1 METS
STRESS PEAK DIAS BP: 76 MMHG
STRESS PEAK SYS BP: 188 MMHG
STRESS PERCENT HR ACHIEVED: 68 %
STRESS POST PEAK HR: 100 BPM
STRESS RATE PRESSURE PRODUCT: ABNORMAL BPM*MMHG
STRESS TARGET HR: 146 BPM

## 2024-07-03 PROCEDURE — 6360000002 HC RX W HCPCS: Performed by: INTERNAL MEDICINE

## 2024-07-03 PROCEDURE — A9502 TC99M TETROFOSMIN: HCPCS | Performed by: INTERNAL MEDICINE

## 2024-07-03 PROCEDURE — 78452 HT MUSCLE IMAGE SPECT MULT: CPT

## 2024-07-03 PROCEDURE — 3430000000 HC RX DIAGNOSTIC RADIOPHARMACEUTICAL: Performed by: INTERNAL MEDICINE

## 2024-07-03 PROCEDURE — 93017 CV STRESS TEST TRACING ONLY: CPT

## 2024-07-03 RX ORDER — AMINOPHYLLINE 25 MG/ML
100 INJECTION, SOLUTION INTRAVENOUS ONCE
Status: COMPLETED | OUTPATIENT
Start: 2024-07-03 | End: 2024-07-03

## 2024-07-03 RX ORDER — REGADENOSON 0.08 MG/ML
0.4 INJECTION, SOLUTION INTRAVENOUS
Status: COMPLETED | OUTPATIENT
Start: 2024-07-03 | End: 2024-07-03

## 2024-07-03 RX ADMIN — AMINOPHYLLINE 100 MG: 25 INJECTION, SOLUTION INTRAVENOUS at 08:37

## 2024-07-03 RX ADMIN — TETROFOSMIN 11.1 MILLICURIE: 1.38 INJECTION, POWDER, LYOPHILIZED, FOR SOLUTION INTRAVENOUS at 07:26

## 2024-07-03 RX ADMIN — TETROFOSMIN 31.9 MILLICURIE: 1.38 INJECTION, POWDER, LYOPHILIZED, FOR SOLUTION INTRAVENOUS at 08:30

## 2024-07-03 RX ADMIN — REGADENOSON 0.4 MG: 0.08 INJECTION, SOLUTION INTRAVENOUS at 08:30

## 2024-07-03 NOTE — NURSING NOTE
Pt completed stress portion of cardiac stress test. Aminophylline 100mg IVP given slowly for nausea and vomiting after Lexiscan. Symptoms resolved. Pt is discharged to nuclear department for final scan. Nuclear tech will remove PIV. Discharge instructions given to pt. Pt verbalizes understanding to discharge instructions.

## 2024-07-05 ENCOUNTER — TELEPHONE (OUTPATIENT)
Dept: CARDIOLOGY CLINIC | Age: 74
End: 2024-07-05

## 2024-07-05 NOTE — TELEPHONE ENCOUNTER
----- Message from Jeremy Ba MD sent at 7/5/2024  8:31 AM EDT -----  Please let the patient know her stress test appeared normal with no evidence of underlying blockage.  Strong heart.  Recent echocardiogram again reviewed with strong heart function and no significant valvular heart disease.  no further recommendations at this time.  Reassuring course to date.  Follow-up 3 months for reassessment

## 2024-07-05 NOTE — TELEPHONE ENCOUNTER
Left message for patient to call back. When she calls back let her know-     Please let the patient know her stress test appeared normal with no evidence of underlying blockage. Strong heart. Recent echocardiogram again reviewed with strong heart function and no significant valvular heart disease. no further recommendations at this time. Reassuring course to date. Follow-up 3 months for reassessment

## 2025-01-17 ENCOUNTER — TELEPHONE (OUTPATIENT)
Dept: TELEMETRY | Age: 75
End: 2025-01-17

## 2025-01-17 NOTE — TELEPHONE ENCOUNTER
Patient does not have >20ppd smoking history. Pt ineligible for annual lung screening. Will close.    Fabiola Kraft RN

## 2025-02-04 ENCOUNTER — TRANSCRIBE ORDERS (OUTPATIENT)
Dept: ADMINISTRATIVE | Age: 75
End: 2025-02-04

## 2025-02-04 DIAGNOSIS — D50.9 IRON DEFICIENCY ANEMIA, UNSPECIFIED IRON DEFICIENCY ANEMIA TYPE: Primary | ICD-10-CM

## 2025-05-07 ENCOUNTER — HOSPITAL ENCOUNTER (OUTPATIENT)
Dept: CT IMAGING | Age: 75
Discharge: HOME | End: 2025-05-07
Payer: MEDICARE

## 2025-05-07 DIAGNOSIS — D50.9 IRON DEFICIENCY ANEMIA, UNSPECIFIED IRON DEFICIENCY ANEMIA TYPE: ICD-10-CM

## 2025-05-07 PROCEDURE — 71250 CT THORAX DX C-: CPT

## (undated) DEVICE — ELECTRODE,RADIOTRANSLUCENT,FOAM,3PK: Brand: MEDLINE

## (undated) DEVICE — FORCEP BX STD CAP 240CM RAD JAW 4

## (undated) DEVICE — ENDOSCOPIC KIT 2 12 FT OP4 DE2 GWN SYR

## (undated) DEVICE — ENDO CARRY-ON PROCEDURE KIT INCLUDES SUCTION TUBING, LUBRICANT, GAUZE, BIOHAZARD STICKER, TRANSPORT PAD AND INTERCEPT BEDSIDE KIT.: Brand: ENDO CARRY-ON PROCEDURE KIT

## (undated) DEVICE — CONMED SCOPE SAVER BITE BLOCK, 20X27 MM: Brand: SCOPE SAVER

## (undated) DEVICE — CANNULA,OXY,ADULT,SUPERSOFT,W/7'TUB,SC: Brand: MEDLINE INDUSTRIES, INC.

## (undated) DEVICE — YANKAUER,BULB TIP,W/O VENT,RIGID,STERILE: Brand: MEDLINE